# Patient Record
Sex: MALE | Race: WHITE | Employment: OTHER | URBAN - NONMETROPOLITAN AREA
[De-identification: names, ages, dates, MRNs, and addresses within clinical notes are randomized per-mention and may not be internally consistent; named-entity substitution may affect disease eponyms.]

---

## 2018-12-31 ENCOUNTER — HOSPITAL ENCOUNTER (OUTPATIENT)
Dept: CARDIAC REHAB | Age: 63
Setting detail: THERAPIES SERIES
Discharge: HOME | End: 2018-12-31
Payer: COMMERCIAL

## 2018-12-31 VITALS
RESPIRATION RATE: 16 BRPM | WEIGHT: 200 LBS | DIASTOLIC BLOOD PRESSURE: 62 MMHG | HEART RATE: 60 BPM | BODY MASS INDEX: 28.63 KG/M2 | HEIGHT: 70 IN | SYSTOLIC BLOOD PRESSURE: 118 MMHG

## 2018-12-31 RX ORDER — FUROSEMIDE 40 MG/1
40 TABLET ORAL 2 TIMES DAILY
COMMUNITY

## 2018-12-31 RX ORDER — AMIODARONE HYDROCHLORIDE 200 MG/1
200 TABLET ORAL DAILY
COMMUNITY

## 2018-12-31 RX ORDER — ATORVASTATIN CALCIUM 40 MG/1
40 TABLET, FILM COATED ORAL DAILY
COMMUNITY

## 2018-12-31 RX ORDER — SPIRONOLACTONE 25 MG/1
25 TABLET ORAL DAILY
COMMUNITY

## 2018-12-31 RX ORDER — ASPIRIN 81 MG/1
81 TABLET, CHEWABLE ORAL DAILY
COMMUNITY

## 2018-12-31 RX ORDER — LISINOPRIL 5 MG/1
5 TABLET ORAL DAILY
COMMUNITY

## 2019-01-07 ENCOUNTER — HOSPITAL ENCOUNTER (OUTPATIENT)
Dept: CARDIAC REHAB | Age: 64
Setting detail: THERAPIES SERIES
Discharge: HOME | End: 2019-01-07
Payer: COMMERCIAL

## 2019-01-07 PROCEDURE — 93798 PHYS/QHP OP CAR RHAB W/ECG: CPT

## 2019-01-09 ENCOUNTER — HOSPITAL ENCOUNTER (OUTPATIENT)
Dept: CARDIAC REHAB | Age: 64
Setting detail: THERAPIES SERIES
Discharge: HOME | End: 2019-01-09
Payer: COMMERCIAL

## 2019-01-09 PROCEDURE — 93798 PHYS/QHP OP CAR RHAB W/ECG: CPT

## 2019-01-10 ENCOUNTER — HOSPITAL ENCOUNTER (OUTPATIENT)
Dept: CARDIAC REHAB | Age: 64
Setting detail: THERAPIES SERIES
Discharge: HOME | End: 2019-01-10
Payer: COMMERCIAL

## 2019-01-10 PROCEDURE — 93798 PHYS/QHP OP CAR RHAB W/ECG: CPT

## 2019-01-14 ENCOUNTER — HOSPITAL ENCOUNTER (OUTPATIENT)
Dept: CARDIAC REHAB | Age: 64
Setting detail: THERAPIES SERIES
Discharge: HOME | End: 2019-01-14
Payer: COMMERCIAL

## 2019-01-14 PROCEDURE — 93798 PHYS/QHP OP CAR RHAB W/ECG: CPT

## 2019-01-16 ENCOUNTER — HOSPITAL ENCOUNTER (OUTPATIENT)
Dept: CARDIAC REHAB | Age: 64
Setting detail: THERAPIES SERIES
Discharge: HOME | End: 2019-01-16
Payer: COMMERCIAL

## 2019-01-16 PROCEDURE — 93798 PHYS/QHP OP CAR RHAB W/ECG: CPT

## 2019-01-17 ENCOUNTER — HOSPITAL ENCOUNTER (OUTPATIENT)
Dept: CARDIAC REHAB | Age: 64
Setting detail: THERAPIES SERIES
Discharge: HOME | End: 2019-01-17
Payer: COMMERCIAL

## 2019-01-17 PROCEDURE — 93798 PHYS/QHP OP CAR RHAB W/ECG: CPT

## 2019-01-21 ENCOUNTER — HOSPITAL ENCOUNTER (OUTPATIENT)
Dept: CARDIAC REHAB | Age: 64
Setting detail: THERAPIES SERIES
Discharge: HOME | End: 2019-01-21
Payer: COMMERCIAL

## 2019-01-21 PROCEDURE — 93798 PHYS/QHP OP CAR RHAB W/ECG: CPT

## 2019-01-23 ENCOUNTER — HOSPITAL ENCOUNTER (OUTPATIENT)
Dept: CARDIAC REHAB | Age: 64
Setting detail: THERAPIES SERIES
Discharge: HOME | End: 2019-01-23
Payer: COMMERCIAL

## 2019-01-23 PROCEDURE — 93798 PHYS/QHP OP CAR RHAB W/ECG: CPT

## 2019-08-28 ENCOUNTER — HOSPITAL ENCOUNTER (EMERGENCY)
Age: 64
Discharge: HOME | End: 2019-08-28
Attending: EMERGENCY MEDICINE
Payer: COMMERCIAL

## 2019-08-28 VITALS
TEMPERATURE: 97.6 F | OXYGEN SATURATION: 94 % | SYSTOLIC BLOOD PRESSURE: 107 MMHG | DIASTOLIC BLOOD PRESSURE: 67 MMHG | RESPIRATION RATE: 20 BRPM | HEART RATE: 65 BPM

## 2019-08-28 DIAGNOSIS — R33.9 URINARY RETENTION: Primary | ICD-10-CM

## 2019-08-28 LAB
-: ABNORMAL
AMORPHOUS: ABNORMAL
BACTERIA: ABNORMAL
BILIRUBIN URINE: ABNORMAL
CASTS UA: ABNORMAL /LPF
COLOR: ABNORMAL
COMMENT UA: ABNORMAL
CRYSTALS, UA: ABNORMAL /HPF
EPITHELIAL CELLS UA: ABNORMAL /HPF (ref 0–5)
GLUCOSE URINE: ABNORMAL
KETONES, URINE: ABNORMAL
LEUKOCYTE ESTERASE, URINE: ABNORMAL
MUCUS: ABNORMAL
NITRITE, URINE: ABNORMAL
OTHER OBSERVATIONS UA: ABNORMAL
PH UA: ABNORMAL (ref 5–9)
PROTEIN UA: ABNORMAL
RBC UA: ABNORMAL /HPF (ref 0–2)
RENAL EPITHELIAL, UA: ABNORMAL /HPF
SPECIFIC GRAVITY UA: 1.02 (ref 1.01–1.02)
TRICHOMONAS: ABNORMAL
TURBIDITY: CLEAR
URINE HGB: ABNORMAL
UROBILINOGEN, URINE: ABNORMAL
WBC UA: ABNORMAL /HPF (ref 0–5)
YEAST: ABNORMAL

## 2019-08-28 PROCEDURE — 87086 URINE CULTURE/COLONY COUNT: CPT

## 2019-08-28 PROCEDURE — 51702 INSERT TEMP BLADDER CATH: CPT

## 2019-08-28 PROCEDURE — 81001 URINALYSIS AUTO W/SCOPE: CPT

## 2019-08-28 PROCEDURE — 99283 EMERGENCY DEPT VISIT LOW MDM: CPT

## 2019-08-28 RX ORDER — PHENAZOPYRIDINE HYDROCHLORIDE 95 MG/1
95 TABLET ORAL 3 TIMES DAILY PRN
COMMUNITY
End: 2019-09-03

## 2019-08-28 RX ORDER — SENNA PLUS 8.6 MG/1
1 TABLET ORAL DAILY
COMMUNITY

## 2019-08-28 RX ORDER — OXYCODONE HYDROCHLORIDE AND ACETAMINOPHEN 5; 325 MG/1; MG/1
2 TABLET ORAL EVERY 4 HOURS PRN
COMMUNITY
End: 2020-01-22 | Stop reason: ALTCHOICE

## 2019-08-28 ASSESSMENT — PAIN DESCRIPTION - DESCRIPTORS
DESCRIPTORS: ACHING
DESCRIPTORS: ACHING

## 2019-08-28 ASSESSMENT — PAIN DESCRIPTION - LOCATION
LOCATION: KNEE
LOCATION: PENIS

## 2019-08-28 ASSESSMENT — PAIN DESCRIPTION - PAIN TYPE
TYPE: ACUTE PAIN
TYPE: ACUTE PAIN

## 2019-08-28 ASSESSMENT — PAIN SCALES - GENERAL
PAINLEVEL_OUTOF10: 9
PAINLEVEL_OUTOF10: 5

## 2019-08-28 ASSESSMENT — PAIN DESCRIPTION - ORIENTATION: ORIENTATION: RIGHT

## 2019-08-28 ASSESSMENT — PAIN DESCRIPTION - FREQUENCY: FREQUENCY: CONTINUOUS

## 2019-08-28 NOTE — ED NOTES
Patient discharged with an assist of two. Patient transferred to wheelchair and assisted to car.       Cass Miranda RN  08/28/19 0752

## 2019-08-29 LAB
CULTURE: NO GROWTH
Lab: NORMAL
SPECIMEN DESCRIPTION: NORMAL

## 2019-09-03 ENCOUNTER — OFFICE VISIT (OUTPATIENT)
Dept: UROLOGY | Age: 64
End: 2019-09-03
Payer: COMMERCIAL

## 2019-09-03 VITALS
SYSTOLIC BLOOD PRESSURE: 102 MMHG | WEIGHT: 221 LBS | BODY MASS INDEX: 32.17 KG/M2 | TEMPERATURE: 97.4 F | DIASTOLIC BLOOD PRESSURE: 72 MMHG

## 2019-09-03 DIAGNOSIS — N13.8 BPH WITH OBSTRUCTION/LOWER URINARY TRACT SYMPTOMS: ICD-10-CM

## 2019-09-03 DIAGNOSIS — R33.9 URINARY RETENTION: Primary | ICD-10-CM

## 2019-09-03 DIAGNOSIS — N40.1 BPH WITH OBSTRUCTION/LOWER URINARY TRACT SYMPTOMS: ICD-10-CM

## 2019-09-03 DIAGNOSIS — Z12.5 PROSTATE CANCER SCREENING: ICD-10-CM

## 2019-09-03 PROCEDURE — 99204 OFFICE O/P NEW MOD 45 MIN: CPT | Performed by: NURSE PRACTITIONER

## 2019-09-03 RX ORDER — TAMSULOSIN HYDROCHLORIDE 0.4 MG/1
0.4 CAPSULE ORAL DAILY
Qty: 30 CAPSULE | Refills: 11 | Status: SHIPPED | OUTPATIENT
Start: 2019-09-03 | End: 2019-10-21 | Stop reason: ALTCHOICE

## 2019-09-03 ASSESSMENT — ENCOUNTER SYMPTOMS
EYE PAIN: 0
BACK PAIN: 0
COLOR CHANGE: 0
VOMITING: 0
COUGH: 0
NAUSEA: 0
ABDOMINAL PAIN: 0
WHEEZING: 0
EYE REDNESS: 0
CONSTIPATION: 0
SHORTNESS OF BREATH: 0

## 2019-09-03 NOTE — PROGRESS NOTES
HPI:    Patient is a 61 y.o. male in no acute distress. He is alert and oriented to person, place, and time. New patient referral from the ER for urinary retention. He did undergo a right knee replacement on 8/26/2019. On 8/28/2019 he did present to the ER with abdominal pain and the inability to urinate. He did have a Herzog catheter placed while in the ER for an unknown amount. Urine culture was negative for infection. Prior to surgery he did have baseline lower urinary tract symptoms of straining with urination, feelings of incomplete bladder emptying, nocturia x4, and frequency every 2 hours. He denies any incontinence, dysuria or gross hematuria. He denies any prior history of urinary retention requiring a catheter. His bowels are moving daily since surgery. He has not had any gross hematuria in the catheter bag. He is tolerating Herzog catheter without complaints. Past Medical History:   Diagnosis Date    Arthritis     CAD (coronary artery disease)     CHF (congestive heart failure) (HCC)     Hyperlipidemia     Hypertension     MI (myocardial infarction) ([facility] [address]      Past Surgical History:   Procedure Laterality Date    HERNIA REPAIR      JOINT REPLACEMENT Right 08/2019    knee     Outpatient Encounter Medications as of 9/3/2019   Medication Sig Dispense Refill    tamsulosin (FLOMAX) 0.4 MG capsule Take 1 capsule by mouth daily Take to facilitate stone passage 30 capsule 11    apixaban (ELIQUIS) 5 MG TABS tablet Take 5 mg by mouth 2 times daily      senna (SENOKOT) 8.6 MG tablet Take 1 tablet by mouth daily      oxyCODONE-acetaminophen (PERCOCET) 5-325 MG per tablet Take 2 tablets by mouth every 4 hours as needed for Pain.       aspirin 81 MG chewable tablet Take 81 mg by mouth daily      amiodarone (CORDARONE) 200 MG tablet Take 200 mg by mouth daily      atorvastatin (LIPITOR) 40 MG tablet Take 40 mg by mouth daily      furosemide (LASIX) 40 MG tablet Take 40 mg by mouth 2 times

## 2019-09-18 ENCOUNTER — OFFICE VISIT (OUTPATIENT)
Dept: UROLOGY | Age: 64
End: 2019-09-18
Payer: COMMERCIAL

## 2019-09-18 VITALS — BODY MASS INDEX: 32.6 KG/M2 | SYSTOLIC BLOOD PRESSURE: 100 MMHG | WEIGHT: 224 LBS | DIASTOLIC BLOOD PRESSURE: 60 MMHG

## 2019-09-18 DIAGNOSIS — N13.8 BPH WITH OBSTRUCTION/LOWER URINARY TRACT SYMPTOMS: ICD-10-CM

## 2019-09-18 DIAGNOSIS — N40.1 BPH WITH OBSTRUCTION/LOWER URINARY TRACT SYMPTOMS: ICD-10-CM

## 2019-09-18 DIAGNOSIS — R33.9 URINARY RETENTION: Primary | ICD-10-CM

## 2019-09-18 PROCEDURE — 99213 OFFICE O/P EST LOW 20 MIN: CPT | Performed by: UROLOGY

## 2019-09-18 PROCEDURE — 51798 US URINE CAPACITY MEASURE: CPT | Performed by: UROLOGY

## 2019-09-18 PROCEDURE — 51700 IRRIGATION OF BLADDER: CPT | Performed by: UROLOGY

## 2019-09-18 ASSESSMENT — ENCOUNTER SYMPTOMS
COUGH: 0
EYE PAIN: 0
VOMITING: 0
WHEEZING: 0
BACK PAIN: 0
NAUSEA: 0
SHORTNESS OF BREATH: 0
COLOR CHANGE: 0
EYE REDNESS: 0
ABDOMINAL PAIN: 0

## 2019-09-18 NOTE — PROGRESS NOTES
Use   Smoking Status Never Smoker   Smokeless Tobacco Never Used       Social History     Substance and Sexual Activity   Alcohol Use Yes    Alcohol/week: 1.0 standard drinks    Types: 1 Glasses of wine per week    Comment: beer       Review of Systems   Constitutional: Negative for appetite change, chills and fever. Eyes: Negative for pain, redness and visual disturbance. Respiratory: Negative for cough, shortness of breath and wheezing. Cardiovascular: Negative for chest pain and leg swelling. Gastrointestinal: Negative for abdominal pain, nausea and vomiting. Genitourinary: Negative for difficulty urinating, discharge, dysuria, flank pain, frequency, hematuria, scrotal swelling and testicular pain. Musculoskeletal: Negative for back pain, joint swelling and myalgias. Skin: Negative for color change, rash and wound. Neurological: Negative for dizziness, tremors and numbness. Hematological: Negative for adenopathy. Does not bruise/bleed easily. There were no vitals taken for this visit. PHYSICAL EXAM:  Constitutional: Patient in no acute distress; Neuro: alert and oriented to person place and time. Psych: Mood and affect normal.  Skin: Normal  Lungs: Respiratory effort normal  Cardiovascular:  Normal peripheral pulses  Abdomen: Soft, non-tender, non-distended with no CVA, flank pain, hepatosplenomegaly or hernia. Kidneys normal.  Bladder non-tender and not distended. Lymphatics: no palpable lymphadenopathy  Penis normal  Urethral meatus normal  Scrotal exam normal  Testicles normal bilaterally  Epididymis normal bilaterally  No evidence of inguinal hernia      No results found for: BUN  No results found for: CREATININE  No results found for: PSA    ASSESSMENT:  This is a 61 y.o. male with the following diagnoses:   Diagnosis Orders   1. Urinary retention     2. BPH with obstruction/lower urinary tract symptoms           PLAN:  Patient will follow-up with us in 4 weeks.   He

## 2019-09-22 ENCOUNTER — HOSPITAL ENCOUNTER (EMERGENCY)
Age: 64
Discharge: HOME | End: 2019-09-22
Attending: EMERGENCY MEDICINE
Payer: COMMERCIAL

## 2019-09-22 VITALS
SYSTOLIC BLOOD PRESSURE: 102 MMHG | TEMPERATURE: 98.3 F | HEART RATE: 70 BPM | RESPIRATION RATE: 16 BRPM | WEIGHT: 224 LBS | OXYGEN SATURATION: 96 % | DIASTOLIC BLOOD PRESSURE: 64 MMHG | BODY MASS INDEX: 32.6 KG/M2

## 2019-09-22 DIAGNOSIS — N39.0 UTI (URINARY TRACT INFECTION), UNCOMPLICATED: Primary | ICD-10-CM

## 2019-09-22 LAB
-: ABNORMAL
AMORPHOUS: ABNORMAL
BACTERIA: ABNORMAL
BILIRUBIN URINE: ABNORMAL
CASTS UA: ABNORMAL /LPF
COLOR: YELLOW
COMMENT UA: ABNORMAL
CRYSTALS, UA: ABNORMAL /HPF
EPITHELIAL CELLS UA: ABNORMAL /HPF (ref 0–5)
GLUCOSE URINE: NEGATIVE
KETONES, URINE: ABNORMAL
LEUKOCYTE ESTERASE, URINE: ABNORMAL
MUCUS: ABNORMAL
NITRITE, URINE: POSITIVE
OTHER OBSERVATIONS UA: ABNORMAL
PH UA: 5.5 (ref 5–9)
PROTEIN UA: ABNORMAL
RBC UA: ABNORMAL /HPF (ref 0–2)
RENAL EPITHELIAL, UA: ABNORMAL /HPF
SPECIFIC GRAVITY UA: 1.02 (ref 1.01–1.02)
TRICHOMONAS: ABNORMAL
TURBIDITY: ABNORMAL
URINE HGB: ABNORMAL
UROBILINOGEN, URINE: NORMAL
WBC UA: ABNORMAL /HPF (ref 0–5)
YEAST: ABNORMAL

## 2019-09-22 PROCEDURE — 87077 CULTURE AEROBIC IDENTIFY: CPT

## 2019-09-22 PROCEDURE — 87086 URINE CULTURE/COLONY COUNT: CPT

## 2019-09-22 PROCEDURE — 6360000002 HC RX W HCPCS: Performed by: EMERGENCY MEDICINE

## 2019-09-22 PROCEDURE — 87186 SC STD MICRODIL/AGAR DIL: CPT

## 2019-09-22 PROCEDURE — 96372 THER/PROPH/DIAG INJ SC/IM: CPT

## 2019-09-22 PROCEDURE — 99283 EMERGENCY DEPT VISIT LOW MDM: CPT

## 2019-09-22 PROCEDURE — 81001 URINALYSIS AUTO W/SCOPE: CPT

## 2019-09-22 RX ORDER — CEFTRIAXONE 1 G/1
1 INJECTION, POWDER, FOR SOLUTION INTRAMUSCULAR; INTRAVENOUS ONCE
Status: COMPLETED | OUTPATIENT
Start: 2019-09-22 | End: 2019-09-22

## 2019-09-22 RX ORDER — CEPHALEXIN 500 MG/1
500 CAPSULE ORAL 4 TIMES DAILY
Qty: 40 CAPSULE | Refills: 0 | Status: SHIPPED | OUTPATIENT
Start: 2019-09-22 | End: 2019-10-02

## 2019-09-22 RX ADMIN — CEFTRIAXONE 1 G: 1 INJECTION, POWDER, FOR SOLUTION INTRAMUSCULAR; INTRAVENOUS at 12:44

## 2019-09-22 NOTE — ED PROVIDER NOTES
[facility] ED  EMERGENCY DEPARTMENT     Pt Name: Andrew Blanco  MRN: [id number]  [place] 1955  Date of evaluation: 9/22/2019  Provider: Emil Granado DO, [address]       Chief Complaint   Patient presents with    Dysuria     minimal out with pain during urination       HISTORY OF PRESENT ILLNESS    Andrew Blanco is a 61 y.o. male who presents to the emergency department from home with complaints of dysuria, urgency, frequency urination and burning in the penis. The patient had a Herzog catheter that was removed 4 days ago, which was placed approximately 2 weeks ago for acute urinary retention postop. States he has been weight voiding well until the symptoms started 2 days ago. No nausea or vomiting. He states he has not had a fever, however his significant other mentioned that he had a temp up to 102 Fahrenheit 3 days ago. He denies any vomiting, denies any back pain. Triage notes and Nursing notes were reviewed by myself. Any discrepancies are addressed above.     PAST MEDICAL HISTORY     Past Medical History:   Diagnosis Date    Arthritis     CAD (coronary artery disease)     [facility]     Hyperlipidemia     Hypertension     MI (myocardial infarction) ([facility] [address]        SURGICAL HISTORY       Past Surgical History:   Procedure Laterality Date    HERNIA REPAIR      JOINT REPLACEMENT Right 08/2019    knee       CURRENT MEDICATIONS       Previous Medications    AMIODARONE (CORDARONE) 200 MG TABLET    Take 200 mg by mouth daily    APIXABAN (ELIQUIS) 5 MG TABS TABLET    Take 5 mg by mouth 2 times daily    ASPIRIN 81 MG CHEWABLE TABLET    Take 81 mg by mouth daily    ATORVASTATIN (LIPITOR) 40 MG TABLET    Take 40 mg by mouth daily    FUROSEMIDE (LASIX) 40 MG TABLET    Take 40 mg by mouth 2 times daily    LISINOPRIL (PRINIVIL;ZESTRIL) 5 MG TABLET    Take 5 mg by mouth daily    METOPROLOL TARTRATE (LOPRESSOR) 25 MG TABLET    Take 25 mg by mouth 2 times daily    OXYCODONE-ACETAMINOPHEN (PERCOCET) 5-325 MG PER TABLET    Take 2 tablets by mouth every 4 hours as needed for Pain. SENNA (SENOKOT) 8.6 MG TABLET    Take 1 tablet by mouth daily    SPIRONOLACTONE (ALDACTONE) 25 MG TABLET    Take 25 mg by mouth daily    TAMSULOSIN (FLOMAX) 0.4 MG CAPSULE    Take 1 capsule by mouth daily Take to facilitate stone passage       ALLERGIES     Patient has no known allergies. FAMILY HISTORY       Family History   Problem Relation Age of Onset    Heart Attack Father         SOCIAL HISTORY       Social History     Socioeconomic History    Marital status:      Spouse name: None    Number of children: None    Years of education: None    Highest education level: None   Occupational History    None   Social Needs    Financial resource strain: None    Food insecurity:     Worry: None     Inability: None    Transportation needs:     Medical: None     Non-medical: None   Tobacco Use    Smoking status: Never Smoker    Smokeless tobacco: Never Used   Substance and Sexual Activity    Alcohol use: Yes     Alcohol/week: 1.0 standard drinks     Types: 1 Glasses of wine per week     Comment: beer    Drug use: No    Sexual activity: Yes     Partners: Female   Lifestyle    Physical activity:     Days per week: None     Minutes per session: None    Stress: None   Relationships    Social connections:     Talks on phone: None     Gets together: None     Attends Orthodox service: None     Active member of club or organization: None     Attends meetings of clubs or organizations: None     Relationship status: None    Intimate partner violence:     Fear of current or ex partner: None     Emotionally abused: None     Physically abused: None     Forced sexual activity: None   Other Topics Concern    None   Social History Narrative    None       REVIEW OF SYSTEMS     Review of Systems   Constitutional: Positive for fever. Negative for chills.

## 2019-09-23 ENCOUNTER — TELEPHONE (OUTPATIENT)
Dept: UROLOGY | Age: 64
End: 2019-09-23

## 2019-09-23 NOTE — TELEPHONE ENCOUNTER
Contacted the patient, he advised he had reported to the ER on Sunday 09/22/2019 for painful urination. Patient was prescribed Keflex by the ER. Patient denies experiencing any current symptoms of pain, burning, hematuria, nausea, vomiting, fever, or chills. Patient was advised to contact the office should he develop any worsening symptoms. Patient voiced understanding.

## 2019-09-24 LAB
CULTURE: ABNORMAL
Lab: ABNORMAL
SPECIMEN DESCRIPTION: ABNORMAL

## 2019-10-15 ENCOUNTER — HOSPITAL ENCOUNTER (OUTPATIENT)
Age: 64
Discharge: HOME | End: 2019-10-15
Payer: COMMERCIAL

## 2019-10-15 LAB
ABSOLUTE EOS #: 0.28 K/UL (ref 0–0.44)
ABSOLUTE IMMATURE GRANULOCYTE: 0.03 K/UL (ref 0–0.3)
ABSOLUTE LYMPH #: 1.91 K/UL (ref 1.1–3.7)
ABSOLUTE MONO #: 0.58 K/UL (ref 0.1–1.2)
BASOPHILS # BLD: 1 % (ref 0–2)
BASOPHILS ABSOLUTE: 0.06 K/UL (ref 0–0.2)
C-REACTIVE PROTEIN: 22.4 MG/L (ref 0–5)
DIFFERENTIAL TYPE: ABNORMAL
EOSINOPHILS RELATIVE PERCENT: 3 % (ref 1–4)
HCT VFR BLD CALC: 43.9 % (ref 40.7–50.3)
HEMOGLOBIN: 13.1 G/DL (ref 13–17)
IMMATURE GRANULOCYTES: 0 %
LYMPHOCYTES # BLD: 18 % (ref 24–43)
MCH RBC QN AUTO: 28.2 PG (ref 25.2–33.5)
MCHC RBC AUTO-ENTMCNC: 29.8 G/DL (ref 28.4–34.8)
MCV RBC AUTO: 94.4 FL (ref 82.6–102.9)
MONOCYTES # BLD: 6 % (ref 3–12)
NRBC AUTOMATED: 0 PER 100 WBC
PDW BLD-RTO: 13.2 % (ref 11.8–14.4)
PLATELET # BLD: 408 K/UL (ref 138–453)
PLATELET ESTIMATE: ABNORMAL
PMV BLD AUTO: 10.6 FL (ref 8.1–13.5)
RBC # BLD: 4.65 M/UL (ref 4.21–5.77)
RBC # BLD: ABNORMAL 10*6/UL
SEDIMENTATION RATE, ERYTHROCYTE: 61 MM (ref 0–20)
SEG NEUTROPHILS: 72 % (ref 36–65)
SEGMENTED NEUTROPHILS ABSOLUTE COUNT: 7.69 K/UL (ref 1.5–8.1)
WBC # BLD: 10.6 K/UL (ref 3.5–11.3)
WBC # BLD: ABNORMAL 10*3/UL

## 2019-10-15 PROCEDURE — 85651 RBC SED RATE NONAUTOMATED: CPT

## 2019-10-15 PROCEDURE — 36415 COLL VENOUS BLD VENIPUNCTURE: CPT

## 2019-10-15 PROCEDURE — 85025 COMPLETE CBC W/AUTO DIFF WBC: CPT

## 2019-10-15 PROCEDURE — 86431 RHEUMATOID FACTOR QUANT: CPT

## 2019-10-15 PROCEDURE — 86140 C-REACTIVE PROTEIN: CPT

## 2019-10-15 PROCEDURE — 86038 ANTINUCLEAR ANTIBODIES: CPT

## 2019-10-16 LAB
ANTI-NUCLEAR ANTIBODY (ANA): NEGATIVE
RHEUMATOID FACTOR: <10 IU/ML

## 2019-10-21 ENCOUNTER — OFFICE VISIT (OUTPATIENT)
Dept: UROLOGY | Age: 64
End: 2019-10-21
Payer: COMMERCIAL

## 2019-10-21 ENCOUNTER — HOSPITAL ENCOUNTER (OUTPATIENT)
Age: 64
Setting detail: SPECIMEN
Discharge: HOME | End: 2019-10-21
Payer: COMMERCIAL

## 2019-10-21 VITALS
WEIGHT: 207 LBS | SYSTOLIC BLOOD PRESSURE: 100 MMHG | HEIGHT: 70 IN | BODY MASS INDEX: 29.63 KG/M2 | DIASTOLIC BLOOD PRESSURE: 70 MMHG

## 2019-10-21 DIAGNOSIS — N13.8 BPH WITH OBSTRUCTION/LOWER URINARY TRACT SYMPTOMS: Primary | ICD-10-CM

## 2019-10-21 DIAGNOSIS — N40.1 BPH WITH OBSTRUCTION/LOWER URINARY TRACT SYMPTOMS: Primary | ICD-10-CM

## 2019-10-21 DIAGNOSIS — R33.9 URINARY RETENTION: ICD-10-CM

## 2019-10-21 DIAGNOSIS — N40.1 BPH WITH OBSTRUCTION/LOWER URINARY TRACT SYMPTOMS: ICD-10-CM

## 2019-10-21 DIAGNOSIS — N13.8 BPH WITH OBSTRUCTION/LOWER URINARY TRACT SYMPTOMS: ICD-10-CM

## 2019-10-21 LAB
-: ABNORMAL
AMORPHOUS: ABNORMAL
BACTERIA: ABNORMAL
BILIRUBIN URINE: NEGATIVE
CASTS UA: ABNORMAL /LPF
COLOR: YELLOW
COMMENT UA: ABNORMAL
CRYSTALS, UA: ABNORMAL /HPF
EPITHELIAL CELLS UA: ABNORMAL /HPF (ref 0–5)
GLUCOSE URINE: NEGATIVE
KETONES, URINE: NEGATIVE
LEUKOCYTE ESTERASE, URINE: NEGATIVE
MUCUS: ABNORMAL
NITRITE, URINE: NEGATIVE
OTHER OBSERVATIONS UA: ABNORMAL
PH UA: 5.5 (ref 5–9)
PROTEIN UA: NEGATIVE
RBC UA: ABNORMAL /HPF (ref 0–2)
RENAL EPITHELIAL, UA: ABNORMAL /HPF
SPECIFIC GRAVITY UA: 1.02 (ref 1.01–1.02)
TRICHOMONAS: ABNORMAL
TURBIDITY: CLEAR
URINE HGB: NEGATIVE
UROBILINOGEN, URINE: NORMAL
WBC UA: ABNORMAL /HPF (ref 0–5)
YEAST: ABNORMAL

## 2019-10-21 PROCEDURE — 87086 URINE CULTURE/COLONY COUNT: CPT

## 2019-10-21 PROCEDURE — 99213 OFFICE O/P EST LOW 20 MIN: CPT | Performed by: NURSE PRACTITIONER

## 2019-10-21 PROCEDURE — 81001 URINALYSIS AUTO W/SCOPE: CPT

## 2019-10-21 PROCEDURE — 51798 US URINE CAPACITY MEASURE: CPT | Performed by: NURSE PRACTITIONER

## 2019-10-21 ASSESSMENT — ENCOUNTER SYMPTOMS
COLOR CHANGE: 0
COUGH: 0
WHEEZING: 0
NAUSEA: 0
EYE PAIN: 0
VOMITING: 0
CONSTIPATION: 1
SHORTNESS OF BREATH: 0
ABDOMINAL PAIN: 0
EYE REDNESS: 0
BACK PAIN: 0

## 2019-10-22 LAB
CULTURE: NORMAL
Lab: NORMAL
SPECIMEN DESCRIPTION: NORMAL

## 2019-10-23 ENCOUNTER — TELEPHONE (OUTPATIENT)
Dept: UROLOGY | Age: 64
End: 2019-10-23

## 2020-01-21 NOTE — PROGRESS NOTES
HPI:    Patient is a 59 y.o. male in no acute distress. He is alert and oriented to person, place, and time. History  New patient referral from the ER for urinary retention.  He did undergo a right knee replacement on 8/26/2019.  On 8/28/2019 he did present to the ER with abdominal pain and the inability to urinate.  He did have a Herzog catheter placed while in the ER for an unknown amount.  Urine culture was negative for infection.  Prior to surgery he did have baseline lower urinary tract symptoms of straining with urination, feelings of incomplete bladder emptying, nocturia x4, and frequency every 2 hours.  He denies any incontinence, dysuria or gross hematuria.  He denies any prior history of urinary retention requiring a catheter.  His bowels are moving daily since surgery.  He has not had any gross hematuria in the catheter bag.  He is tolerating Herzog catheter without complaints.     Currently  Pt is here today for 3-month follow-up. Patient did get a recent PSA. This value was. Patient is voiding well. He said he had stopped his Flomax. She was able to void 20 minutes ago. His current random bladder scan is 173 mL. Patient was unable to void approximately 200 mL. He is complaining about nocturia x2. He reports some urgency and frequency during the day. He is taking diuretics. No recent gross hematuria dysuria. Patient did have recent lab work done at the [address] [place] [address]. He did not have this available to us.     Past Medical History:   Diagnosis Date    Arthritis     CAD (coronary artery disease)     CHF (congestive heart failure) (HCC)     Hyperlipidemia     Hypertension     MI (myocardial infarction) ([facility] [address]      Past Surgical History:   Procedure Laterality Date    HERNIA REPAIR      JOINT REPLACEMENT Right 08/2019    knee     Outpatient Encounter Medications as of 1/22/2020   Medication Sig Dispense Refill    apixaban (ELIQUIS) 5 MG TABS tablet Take 5 mg by mouth 2 times daily      senna (SENOKOT) 8.6 MG tablet Take 1 tablet by mouth daily      oxyCODONE-acetaminophen (PERCOCET) 5-325 MG per tablet Take 2 tablets by mouth every 4 hours as needed for Pain.  aspirin 81 MG chewable tablet Take 81 mg by mouth daily      amiodarone (CORDARONE) 200 MG tablet Take 200 mg by mouth daily      atorvastatin (LIPITOR) 40 MG tablet Take 40 mg by mouth daily      furosemide (LASIX) 40 MG tablet Take 40 mg by mouth 2 times daily      lisinopril (PRINIVIL;ZESTRIL) 5 MG tablet Take 5 mg by mouth daily      metoprolol tartrate (LOPRESSOR) 25 MG tablet Take 25 mg by mouth 2 times daily      spironolactone (ALDACTONE) 25 MG tablet Take 25 mg by mouth daily       No facility-administered encounter medications on file as of 1/22/2020. Current Outpatient Medications on File Prior to Visit   Medication Sig Dispense Refill    apixaban (ELIQUIS) 5 MG TABS tablet Take 5 mg by mouth 2 times daily      senna (SENOKOT) 8.6 MG tablet Take 1 tablet by mouth daily      oxyCODONE-acetaminophen (PERCOCET) 5-325 MG per tablet Take 2 tablets by mouth every 4 hours as needed for Pain.  aspirin 81 MG chewable tablet Take 81 mg by mouth daily      amiodarone (CORDARONE) 200 MG tablet Take 200 mg by mouth daily      atorvastatin (LIPITOR) 40 MG tablet Take 40 mg by mouth daily      furosemide (LASIX) 40 MG tablet Take 40 mg by mouth 2 times daily      lisinopril (PRINIVIL;ZESTRIL) 5 MG tablet Take 5 mg by mouth daily      metoprolol tartrate (LOPRESSOR) 25 MG tablet Take 25 mg by mouth 2 times daily      spironolactone (ALDACTONE) 25 MG tablet Take 25 mg by mouth daily       No current facility-administered medications on file prior to visit. Patient has no known allergies.   Family History   Problem Relation Age of Onset    Heart Attack Father      Social History     Tobacco Use   Smoking Status Never Smoker   Smokeless Tobacco Never Used       Social History     Substance and Sexual Activity   Alcohol us in 6 months with a repeat PVR. We will obtain labs from outside facility.

## 2020-01-22 ENCOUNTER — OFFICE VISIT (OUTPATIENT)
Dept: UROLOGY | Age: 65
End: 2020-01-22
Payer: COMMERCIAL

## 2020-01-22 VITALS
HEIGHT: 72 IN | DIASTOLIC BLOOD PRESSURE: 68 MMHG | SYSTOLIC BLOOD PRESSURE: 122 MMHG | WEIGHT: 230 LBS | TEMPERATURE: 98.7 F | BODY MASS INDEX: 31.15 KG/M2

## 2020-01-22 PROCEDURE — 99213 OFFICE O/P EST LOW 20 MIN: CPT | Performed by: UROLOGY

## 2020-01-22 PROCEDURE — 51798 US URINE CAPACITY MEASURE: CPT | Performed by: UROLOGY

## 2020-01-22 ASSESSMENT — ENCOUNTER SYMPTOMS
EYE PAIN: 0
WHEEZING: 0
ABDOMINAL PAIN: 0
COUGH: 0
EYE REDNESS: 0
SHORTNESS OF BREATH: 0
NAUSEA: 0
COLOR CHANGE: 0
BACK PAIN: 0
VOMITING: 0

## 2020-07-27 ENCOUNTER — OFFICE VISIT (OUTPATIENT)
Dept: UROLOGY | Age: 65
End: 2020-07-27
Payer: COMMERCIAL

## 2020-07-27 VITALS
DIASTOLIC BLOOD PRESSURE: 68 MMHG | SYSTOLIC BLOOD PRESSURE: 110 MMHG | HEIGHT: 72 IN | TEMPERATURE: 97.5 F | BODY MASS INDEX: 33.32 KG/M2 | WEIGHT: 246 LBS

## 2020-07-27 PROCEDURE — 99214 OFFICE O/P EST MOD 30 MIN: CPT | Performed by: UROLOGY

## 2020-07-27 PROCEDURE — 51798 US URINE CAPACITY MEASURE: CPT | Performed by: UROLOGY

## 2020-07-27 RX ORDER — TAMSULOSIN HYDROCHLORIDE 0.4 MG/1
0.4 CAPSULE ORAL EVERY EVENING
Qty: 30 CAPSULE | Refills: 11 | Status: SHIPPED | OUTPATIENT
Start: 2020-07-27 | End: 2021-02-24

## 2020-07-27 NOTE — PROGRESS NOTES
HPI:    Patient is a 59 y.o. male in no acute distress. He is alert and oriented to person, place, and time. History  New patient referral from the ER for urinary retention.  He did undergo a right knee replacement on 8/26/2019.  On 8/28/2019 he did present to the ER with abdominal pain and the inability to urinate.  He did have a Herzog catheter placed while in the ER for an unknown amount.  Urine culture was negative for infection.  Prior to surgery he did have baseline lower urinary tract symptoms of straining with urination, feelings of incomplete bladder emptying, nocturia x4, and frequency every 2 hours.  He denies any incontinence, dysuria or gross hematuria.  He denies any prior history of urinary retention requiring a catheter.  His bowels are moving daily since surgery.  He has not had any gross hematuria in the catheter bag.  He is tolerating Herzog catheter without complaints.       Currently  Patient is here today for repeat PVR. He is also here for repeat PSA. Patient is doing well. He was unable to void today in the office but did have a residual of 0 mL. He is getting his left-sided knee replaced. He is concerned about urinary issues after the procedure. No recent gross hematuria dysuria. IPSS Questionnaire (AUA-7): Over the past month    1)  How often have you had a sensation of not emptying your bladder completely after you finish urinating? 0 - Not at all   2)  How often have you had to urinate again less than two hours after you finished urinating? 1 - Less than 1 time in 5   3)  How often have you found you stopped and started again several times when you urinated? 1 - Less than 1 time in 5   4) How difficult have you found it to postpone urination? 4 - More than half the time   5) How often have you had a weak urinary stream?  3 - About half the time   6) How often have you had to push or strain to begin urination?   0 - Not at all   7) How many times did you most typically get up to urinate from the time you went to bed until the time you got up in the morning? 2 - 2 times   Total 11   QOL 2       Past Medical History:   Diagnosis Date    Arthritis     CAD (coronary artery disease)     [facility]     Hyperlipidemia     Hypertension     MI (myocardial infarction) ([facility] [address]      Past Surgical History:   Procedure Laterality Date    HERNIA REPAIR      JOINT REPLACEMENT Right 08/2019    knee     Outpatient Encounter Medications as of 7/27/2020   Medication Sig Dispense Refill    NONFORMULARY Indications: stool softner       NONFORMULARY       Glucosamine-Chondroit-Vit C-Mn (GLUCOSAMINE CHONDR 500 COMPLEX PO) Take by mouth      apixaban (ELIQUIS) 5 MG TABS tablet Take 5 mg by mouth 2 times daily      senna (SENOKOT) 8.6 MG tablet Take 1 tablet by mouth daily      aspirin 81 MG chewable tablet Take 81 mg by mouth daily      amiodarone (CORDARONE) 200 MG tablet Take 200 mg by mouth daily      atorvastatin (LIPITOR) 40 MG tablet Take 40 mg by mouth daily      furosemide (LASIX) 40 MG tablet Take 40 mg by mouth 2 times daily      lisinopril (PRINIVIL;ZESTRIL) 5 MG tablet Take 5 mg by mouth daily      metoprolol tartrate (LOPRESSOR) 25 MG tablet Take 25 mg by mouth 2 times daily      spironolactone (ALDACTONE) 25 MG tablet Take 25 mg by mouth daily       No facility-administered encounter medications on file as of 7/27/2020.        Current Outpatient Medications on File Prior to Visit   Medication Sig Dispense Refill    NONFORMULARY Indications: stool softner       NONFORMULARY       Glucosamine-Chondroit-Vit C-Mn (GLUCOSAMINE CHONDR 500 COMPLEX PO) Take by mouth      apixaban (ELIQUIS) 5 MG TABS tablet Take 5 mg by mouth 2 times daily      senna (SENOKOT) 8.6 MG tablet Take 1 tablet by mouth daily      aspirin 81 MG chewable tablet Take 81 mg by mouth daily      amiodarone (CORDARONE) 200 MG tablet Take 200 mg by mouth daily      atorvastatin urinary tract symptoms  MT MEASUREMENT,POST-VOID RESIDUAL VOLUME BY US,NON-IMAGING    tamsulosin (FLOMAX) 0.4 MG capsule         PLAN:  We will start him on Flomax now. This will be for his upcoming knee surgery. We will see him back in 6 months for lab review. He should have a repeat PSA done at the [address] [place] [address]

## 2020-07-27 NOTE — PATIENT INSTRUCTIONS
SURVEY:    You may be receiving a survey from [employer] regarding your visit today. Please complete the survey to enable us to provide the highest quality of care to you and your family. If you cannot score us a very good on any question, please call the office to discuss how we could of made your experience a very good one. Thank you.

## 2020-09-18 ENCOUNTER — HOSPITAL ENCOUNTER (OUTPATIENT)
Dept: PREADMISSION TESTING | Age: 65
Setting detail: SPECIMEN
Discharge: HOME | End: 2020-09-22
Payer: COMMERCIAL

## 2020-09-18 VITALS
DIASTOLIC BLOOD PRESSURE: 80 MMHG | WEIGHT: 243.3 LBS | SYSTOLIC BLOOD PRESSURE: 146 MMHG | TEMPERATURE: 97.8 F | OXYGEN SATURATION: 96 % | RESPIRATION RATE: 18 BRPM | HEIGHT: 71 IN | BODY MASS INDEX: 34.06 KG/M2 | HEART RATE: 57 BPM

## 2020-09-18 LAB
ABSOLUTE EOS #: 0.13 K/UL (ref 0–0.44)
ABSOLUTE IMMATURE GRANULOCYTE: <0.03 K/UL (ref 0–0.3)
ABSOLUTE LYMPH #: 1.31 K/UL (ref 1.1–3.7)
ABSOLUTE MONO #: 0.49 K/UL (ref 0.1–1.2)
ANION GAP SERPL CALCULATED.3IONS-SCNC: 8 MMOL/L (ref 9–17)
BASOPHILS # BLD: 1 % (ref 0–2)
BASOPHILS ABSOLUTE: 0.03 K/UL (ref 0–0.2)
BUN BLDV-MCNC: 18 MG/DL (ref 8–23)
BUN/CREAT BLD: 19 (ref 9–20)
CALCIUM SERPL-MCNC: 9.2 MG/DL (ref 8.6–10.4)
CHLORIDE BLD-SCNC: 98 MMOL/L (ref 98–107)
CO2: 25 MMOL/L (ref 20–31)
CREAT SERPL-MCNC: 0.95 MG/DL (ref 0.7–1.2)
DIFFERENTIAL TYPE: ABNORMAL
EOSINOPHILS RELATIVE PERCENT: 2 % (ref 1–4)
GFR AFRICAN AMERICAN: >60 ML/MIN
GFR NON-AFRICAN AMERICAN: >60 ML/MIN
GFR SERPL CREATININE-BSD FRML MDRD: ABNORMAL ML/MIN/{1.73_M2}
GFR SERPL CREATININE-BSD FRML MDRD: ABNORMAL ML/MIN/{1.73_M2}
GLUCOSE BLD-MCNC: 93 MG/DL (ref 70–99)
HCT VFR BLD CALC: 43.5 % (ref 40.7–50.3)
HEMOGLOBIN: 13.5 G/DL (ref 13–17)
IMMATURE GRANULOCYTES: 0 %
LYMPHOCYTES # BLD: 21 % (ref 24–43)
MCH RBC QN AUTO: 29.2 PG (ref 25.2–33.5)
MCHC RBC AUTO-ENTMCNC: 31 G/DL (ref 28.4–34.8)
MCV RBC AUTO: 94 FL (ref 82.6–102.9)
MONOCYTES # BLD: 8 % (ref 3–12)
NRBC AUTOMATED: 0 PER 100 WBC
PDW BLD-RTO: 14.2 % (ref 11.8–14.4)
PLATELET # BLD: 280 K/UL (ref 138–453)
PLATELET ESTIMATE: ABNORMAL
PMV BLD AUTO: 10.8 FL (ref 8.1–13.5)
POTASSIUM SERPL-SCNC: 5 MMOL/L (ref 3.7–5.3)
RBC # BLD: 4.63 M/UL (ref 4.21–5.77)
RBC # BLD: ABNORMAL 10*6/UL
SEG NEUTROPHILS: 68 % (ref 36–65)
SEGMENTED NEUTROPHILS ABSOLUTE COUNT: 4.37 K/UL (ref 1.5–8.1)
SODIUM BLD-SCNC: 131 MMOL/L (ref 135–144)
WBC # BLD: 6.4 K/UL (ref 3.5–11.3)
WBC # BLD: ABNORMAL 10*3/UL

## 2020-09-18 PROCEDURE — 36415 COLL VENOUS BLD VENIPUNCTURE: CPT

## 2020-09-18 PROCEDURE — 80048 BASIC METABOLIC PNL TOTAL CA: CPT

## 2020-09-18 PROCEDURE — 85025 COMPLETE CBC W/AUTO DIFF WBC: CPT

## 2020-09-18 PROCEDURE — 87641 MR-STAPH DNA AMP PROBE: CPT

## 2020-09-18 RX ORDER — ACETAMINOPHEN 325 MG/1
650 TABLET ORAL ONCE
Status: CANCELLED | OUTPATIENT
Start: 2020-09-18 | End: 2020-09-18

## 2020-09-18 RX ORDER — TRANEXAMIC ACID 650 1/1
1300 TABLET ORAL ONCE
Status: CANCELLED | OUTPATIENT
Start: 2020-09-18

## 2020-09-18 RX ORDER — CELECOXIB 200 MG/1
400 CAPSULE ORAL ONCE
Status: CANCELLED | OUTPATIENT
Start: 2020-09-18 | End: 2020-09-18

## 2020-09-18 NOTE — PROGRESS NOTES
[facility]   Preadmission Testing    Name: Terri Bustamante  : 1955  Patient Phone: [phone] (home)     Procedure Total Knee Left   Date of Procedure: 10/19/2020  Surgeon: No att. providers found    Ht:  5' 10.5\" (179.1 cm)  Wt: 243 lb 4.8 oz (110.4 kg)  Wt method: Actual    Allergies: No Known Allergies    Peanut allergy: No    Latex Allergy Screening Tool  Have you ever had a reaction to or been told by a physician that you have an allergy to latex or natural rubber?: No    Vitals:    20 1030   BP: (!) 146/80   Pulse: 57   Resp: 18   Temp: 97.8 °F (36.6 °C)   SpO2: 96%       No LMP for male patient. Do you take blood thinners? [x] Yes    [] No         Instructed to stop blood thinners prior to procedure? [x] Yes    [] No      [] N/A   Do you have sleep apnea? [] Yes    [x] No     Instructed to bring CPAP machine? [] Yes    [] No    [x] N/A   Do you have acid reflux ? [] Yes    [x] No     Do you have  hiatal hernia? [] Yes    [x] No    Do you ever experience motion sickness? [] Yes    [x] No     Have you had a respiratory infection or sore throat in last 4 weeks before surgery? [] Yes    [x] No     Do you have poorly controlled asthma or COPD? [] Yes    [x] No     Do you have a history of angina in the last month or symptomatic arrhythmia? [] Yes    [x] No     Do you have significant central nervous system disease? [] Yes    [x] No     Have you had an EKG, labs, or chest xray in last 12 months? If yes provide copies to anesthesia   [x] Yes    [] No       [x] Lab    [x] EKG    [] CXR     Have you had a stress test?     [] Yes    [x] No    When/where:     Was it normal?    [] Yes    [] No   Do you or your family have a history of Malignant Hyperthermia?    [] Yes    [x] No               PAT Call/Visit Questions  Person Interviewed: patient  Relationship to Patient: self  Surgery Location Verified: Yes  Patient Language: English  Medical History Reviewed: Yes  NPO Status Reinforced: Yes  Ride and Caregiver Arranged: Yes  Ride Caregiver Provider: Isabella Tian - girlfriend  Patient Knows to Bring Current Medications: Yes    Pre-AdmissionTesting Checklist  Patient has been to this health system before?: Yes  Does patient refuse blood?: No  Healthcare Directive: Yes, patient has an advance directive for healthcare treatment  Copy in Chart: No, copy requested from family   needed: No  Patient can read and write?: No  Meds-to-Beds: Does the patient want to have any new prescriptions delivered to bedside prior to discharge?: Not Assessed  History given by: Patient  Providing self care at home?: Yes  Discharge transport (for same day patients): Family    Patient instructed on the pre-operative, intra-operative, and post-operative process? Yes  Medication instructions reviewed with patient? Yes  Pre operative instruction sheet reviewed and given to patient in PAT?   Yes

## 2020-09-19 LAB
MRSA, DNA, NASAL: NORMAL
SPECIMEN DESCRIPTION: NORMAL

## 2020-10-14 ENCOUNTER — HOSPITAL ENCOUNTER (OUTPATIENT)
Dept: PHYSICAL THERAPY | Age: 65
Setting detail: THERAPIES SERIES
Discharge: HOME | End: 2020-10-14
Payer: COMMERCIAL

## 2020-10-14 DIAGNOSIS — Z01.818 PREOP TESTING: Primary | ICD-10-CM

## 2020-10-14 PROCEDURE — 97110 THERAPEUTIC EXERCISES: CPT

## 2020-10-14 PROCEDURE — 97116 GAIT TRAINING THERAPY: CPT

## 2020-10-14 NOTE — PROGRESS NOTES
Phone: [phone]        Fax: [address] [facility]  Physical Therapy  Gait Training/Discharge Summary  Date: 10/14/2020    Patient Name: Soraya Soria          : 1955  (59 y.o.)  [facility] #: [id number]    Attending Physician: Kale Brown MD    Diagnosis: Pre-op testing (Z01.818)    Reason for Referral:  [] Crutch Training   [x] [employer]   [] Other:    Objective Assessment:    [x]  Strength of uninvolved extremities are all within functional limits   []  Other:      Weight Bearing Status:  [] Right Extremity  [x] Left Extremity  [] NWB  [] PWB    [x] WBAT  [] TTWB    Treatment:  [x] Instructed in appropriate gait with crutches/walker  [x]   Crutches/walker fitted to patient at accurate height  [x] Instructed on gait training at level ground  [x] Instructed on gait training with use of stairs  [x] Instructed on sit to stand utilizing crutches/walker  []   Other:     Home Exercise Program - Instructed Patient:   [x] Handout given regarding LE ROM / strengthening exercises      Treatment Goals:  [x]  Met []  Not Met 10/14/2020   [x] Patient will be independent crutch/walker training    Treatment Plan:   [x]  Gait training, as noted above    [x]  Exercise education, as stated above     Rehab Potential: []  Poor   []  Fair   [x]  Good   []  Excellent     If there are any questions regarding the plan of care, please do not hesitate to contact the center.    Thank you for your referral.      Therapists Signature:  Jacy Harley PT, DPT            Date: 10/14/2020        To be completed by the referring physicians   [] Approve the treatment plan as indicated   []  Comments:   Physicians Signature:                        Date: 10/14/2020

## 2020-10-23 ENCOUNTER — HOSPITAL ENCOUNTER (OUTPATIENT)
Dept: PREADMISSION TESTING | Age: 65
Discharge: HOME | End: 2020-10-27
Payer: COMMERCIAL

## 2020-10-23 ENCOUNTER — HOSPITAL ENCOUNTER (OUTPATIENT)
Age: 65
Discharge: HOME | End: 2020-10-23
Payer: COMMERCIAL

## 2020-10-23 LAB
ABO/RH: NORMAL
ANTIBODY SCREEN: NEGATIVE
ARM BAND NUMBER: NORMAL
EXPIRATION DATE: NORMAL
SARS-COV-2, RAPID: NORMAL
SARS-COV-2: NORMAL
SARS-COV-2: NOT DETECTED
SOURCE: NORMAL

## 2020-10-23 PROCEDURE — C9803 HOPD COVID-19 SPEC COLLECT: HCPCS

## 2020-10-23 PROCEDURE — U0003 INFECTIOUS AGENT DETECTION BY NUCLEIC ACID (DNA OR RNA); SEVERE ACUTE RESPIRATORY SYNDROME CORONAVIRUS 2 (SARS-COV-2) (CORONAVIRUS DISEASE [COVID-19]), AMPLIFIED PROBE TECHNIQUE, MAKING USE OF HIGH THROUGHPUT TECHNOLOGIES AS DESCRIBED BY CMS-2020-01-R: HCPCS

## 2020-10-23 PROCEDURE — 86900 BLOOD TYPING SEROLOGIC ABO: CPT

## 2020-10-23 PROCEDURE — 86850 RBC ANTIBODY SCREEN: CPT

## 2020-10-23 PROCEDURE — 86901 BLOOD TYPING SEROLOGIC RH(D): CPT

## 2020-10-23 PROCEDURE — 36415 COLL VENOUS BLD VENIPUNCTURE: CPT

## 2020-10-28 ENCOUNTER — ANESTHESIA EVENT (OUTPATIENT)
Dept: OPERATING ROOM | Age: 65
End: 2020-10-28
Payer: COMMERCIAL

## 2020-10-29 ENCOUNTER — ANESTHESIA (OUTPATIENT)
Dept: OPERATING ROOM | Age: 65
End: 2020-10-29
Payer: COMMERCIAL

## 2020-10-29 ENCOUNTER — HOSPITAL ENCOUNTER (OUTPATIENT)
Age: 65
Setting detail: OBSERVATION
Discharge: HOME | End: 2020-10-30
Attending: ORTHOPAEDIC SURGERY | Admitting: ORTHOPAEDIC SURGERY
Payer: COMMERCIAL

## 2020-10-29 VITALS — DIASTOLIC BLOOD PRESSURE: 68 MMHG | SYSTOLIC BLOOD PRESSURE: 110 MMHG | OXYGEN SATURATION: 95 % | TEMPERATURE: 97.5 F

## 2020-10-29 PROBLEM — Z96.652 S/P TOTAL KNEE ARTHROPLASTY, LEFT: Status: ACTIVE | Noted: 2020-10-29

## 2020-10-29 PROCEDURE — 3700000001 HC ADD 15 MINUTES (ANESTHESIA): Performed by: ORTHOPAEDIC SURGERY

## 2020-10-29 PROCEDURE — 2500000003 HC RX 250 WO HCPCS: Performed by: NURSE ANESTHETIST, CERTIFIED REGISTERED

## 2020-10-29 PROCEDURE — 2580000003 HC RX 258: Performed by: NURSE ANESTHETIST, CERTIFIED REGISTERED

## 2020-10-29 PROCEDURE — 3700000000 HC ANESTHESIA ATTENDED CARE: Performed by: ORTHOPAEDIC SURGERY

## 2020-10-29 PROCEDURE — 88300 SURGICAL PATH GROSS: CPT

## 2020-10-29 PROCEDURE — 6360000002 HC RX W HCPCS: Performed by: ORTHOPAEDIC SURGERY

## 2020-10-29 PROCEDURE — 2500000003 HC RX 250 WO HCPCS: Performed by: ORTHOPAEDIC SURGERY

## 2020-10-29 PROCEDURE — 3600000015 HC SURGERY LEVEL 5 ADDTL 15MIN: Performed by: ORTHOPAEDIC SURGERY

## 2020-10-29 PROCEDURE — 7100000000 HC PACU RECOVERY - FIRST 15 MIN: Performed by: ORTHOPAEDIC SURGERY

## 2020-10-29 PROCEDURE — 6360000002 HC RX W HCPCS: Performed by: NURSE ANESTHETIST, CERTIFIED REGISTERED

## 2020-10-29 PROCEDURE — 6370000000 HC RX 637 (ALT 250 FOR IP): Performed by: ORTHOPAEDIC SURGERY

## 2020-10-29 PROCEDURE — C1713 ANCHOR/SCREW BN/BN,TIS/BN: HCPCS | Performed by: ORTHOPAEDIC SURGERY

## 2020-10-29 PROCEDURE — 2709999900 HC NON-CHARGEABLE SUPPLY: Performed by: ORTHOPAEDIC SURGERY

## 2020-10-29 PROCEDURE — 3600000005 HC SURGERY LEVEL 5 BASE: Performed by: ORTHOPAEDIC SURGERY

## 2020-10-29 PROCEDURE — 2580000003 HC RX 258: Performed by: ORTHOPAEDIC SURGERY

## 2020-10-29 PROCEDURE — G0378 HOSPITAL OBSERVATION PER HR: HCPCS

## 2020-10-29 PROCEDURE — 7100000001 HC PACU RECOVERY - ADDTL 15 MIN: Performed by: ORTHOPAEDIC SURGERY

## 2020-10-29 PROCEDURE — C1776 JOINT DEVICE (IMPLANTABLE): HCPCS | Performed by: ORTHOPAEDIC SURGERY

## 2020-10-29 PROCEDURE — 64447 NJX AA&/STRD FEMORAL NRV IMG: CPT | Performed by: NURSE ANESTHETIST, CERTIFIED REGISTERED

## 2020-10-29 DEVICE — COMPONENT PAT DIA35MM THK9MM STD KNEE VIVACIT-E CEM PERSONA: Type: IMPLANTABLE DEVICE | Site: KNEE | Status: FUNCTIONAL

## 2020-10-29 DEVICE — IMPL KNEE PSN FEM CR CMT CCR STD SZ8 L: Type: IMPLANTABLE DEVICE | Site: KNEE | Status: FUNCTIONAL

## 2020-10-29 DEVICE — EXTENSION STEM SZ G 5DEG L TIBL KNEE CEM NAT TIB: Type: IMPLANTABLE DEVICE | Site: KNEE | Status: FUNCTIONAL

## 2020-10-29 DEVICE — CEMENT BNE 40GM HI VISC RADPQ FOR REV SURG: Type: IMPLANTABLE DEVICE | Site: KNEE | Status: FUNCTIONAL

## 2020-10-29 DEVICE — IMPLANTABLE DEVICE: Type: IMPLANTABLE DEVICE | Site: KNEE | Status: FUNCTIONAL

## 2020-10-29 RX ORDER — PROPOFOL 10 MG/ML
INJECTION, EMULSION INTRAVENOUS PRN
Status: DISCONTINUED | OUTPATIENT
Start: 2020-10-29 | End: 2020-10-29 | Stop reason: SDUPTHER

## 2020-10-29 RX ORDER — SODIUM CHLORIDE, SODIUM LACTATE, POTASSIUM CHLORIDE, CALCIUM CHLORIDE 600; 310; 30; 20 MG/100ML; MG/100ML; MG/100ML; MG/100ML
INJECTION, SOLUTION INTRAVENOUS CONTINUOUS
Status: DISCONTINUED | OUTPATIENT
Start: 2020-10-29 | End: 2020-10-29

## 2020-10-29 RX ORDER — SODIUM CHLORIDE 0.9 % (FLUSH) 0.9 %
10 SYRINGE (ML) INJECTION EVERY 12 HOURS SCHEDULED
Status: DISCONTINUED | OUTPATIENT
Start: 2020-10-29 | End: 2020-10-30 | Stop reason: HOSPADM

## 2020-10-29 RX ORDER — ROPIVACAINE HYDROCHLORIDE 5 MG/ML
INJECTION, SOLUTION EPIDURAL; INFILTRATION; PERINEURAL PRN
Status: DISCONTINUED | OUTPATIENT
Start: 2020-10-29 | End: 2020-10-29 | Stop reason: SDUPTHER

## 2020-10-29 RX ORDER — AMIODARONE HYDROCHLORIDE 200 MG/1
200 TABLET ORAL DAILY
Status: DISCONTINUED | OUTPATIENT
Start: 2020-10-29 | End: 2020-10-30 | Stop reason: HOSPADM

## 2020-10-29 RX ORDER — FUROSEMIDE 40 MG/1
40 TABLET ORAL 2 TIMES DAILY
Status: DISCONTINUED | OUTPATIENT
Start: 2020-10-29 | End: 2020-10-30 | Stop reason: HOSPADM

## 2020-10-29 RX ORDER — HYDROCODONE BITARTRATE AND ACETAMINOPHEN 5; 325 MG/1; MG/1
1 TABLET ORAL
Status: DISCONTINUED | OUTPATIENT
Start: 2020-10-29 | End: 2020-10-29 | Stop reason: HOSPADM

## 2020-10-29 RX ORDER — ACETAMINOPHEN 325 MG/1
650 TABLET ORAL ONCE
Status: COMPLETED | OUTPATIENT
Start: 2020-10-29 | End: 2020-10-29

## 2020-10-29 RX ORDER — SODIUM CHLORIDE 9 MG/ML
INJECTION INTRAVENOUS PRN
Status: DISCONTINUED | OUTPATIENT
Start: 2020-10-29 | End: 2020-10-29 | Stop reason: SDUPTHER

## 2020-10-29 RX ORDER — TAMSULOSIN HYDROCHLORIDE 0.4 MG/1
0.4 CAPSULE ORAL EVERY EVENING
Status: DISCONTINUED | OUTPATIENT
Start: 2020-10-29 | End: 2020-10-30 | Stop reason: HOSPADM

## 2020-10-29 RX ORDER — SPIRONOLACTONE 25 MG/1
25 TABLET ORAL DAILY
Status: DISCONTINUED | OUTPATIENT
Start: 2020-10-29 | End: 2020-10-30 | Stop reason: HOSPADM

## 2020-10-29 RX ORDER — ATORVASTATIN CALCIUM 40 MG/1
40 TABLET, FILM COATED ORAL NIGHTLY
Status: DISCONTINUED | OUTPATIENT
Start: 2020-10-29 | End: 2020-10-30 | Stop reason: HOSPADM

## 2020-10-29 RX ORDER — CELECOXIB 200 MG/1
400 CAPSULE ORAL ONCE
Status: COMPLETED | OUTPATIENT
Start: 2020-10-29 | End: 2020-10-29

## 2020-10-29 RX ORDER — TRANEXAMIC ACID 100 MG/ML
INJECTION, SOLUTION INTRAVENOUS PRN
Status: DISCONTINUED | OUTPATIENT
Start: 2020-10-29 | End: 2020-10-29 | Stop reason: ALTCHOICE

## 2020-10-29 RX ORDER — DIMENHYDRINATE 50 MG/1
50 TABLET ORAL ONCE
Status: COMPLETED | OUTPATIENT
Start: 2020-10-29 | End: 2020-10-29

## 2020-10-29 RX ORDER — SENNA PLUS 8.6 MG/1
1 TABLET ORAL DAILY
Status: DISCONTINUED | OUTPATIENT
Start: 2020-10-29 | End: 2020-10-30 | Stop reason: HOSPADM

## 2020-10-29 RX ORDER — ACETAMINOPHEN 325 MG/1
650 TABLET ORAL EVERY 6 HOURS PRN
Status: DISCONTINUED | OUTPATIENT
Start: 2020-10-29 | End: 2020-10-30 | Stop reason: HOSPADM

## 2020-10-29 RX ORDER — LISINOPRIL 5 MG/1
5 TABLET ORAL DAILY
Status: DISCONTINUED | OUTPATIENT
Start: 2020-10-29 | End: 2020-10-30 | Stop reason: HOSPADM

## 2020-10-29 RX ORDER — BUPIVACAINE/KETOROLAC/KETAMINE 150-60/50
SYRINGE (ML) INJECTION
Status: DISCONTINUED
Start: 2020-10-29 | End: 2020-10-29

## 2020-10-29 RX ORDER — CEFAZOLIN SODIUM 2 G/50ML
2 SOLUTION INTRAVENOUS EVERY 8 HOURS
Status: COMPLETED | OUTPATIENT
Start: 2020-10-29 | End: 2020-10-30

## 2020-10-29 RX ORDER — SODIUM CHLORIDE 0.9 % (FLUSH) 0.9 %
10 SYRINGE (ML) INJECTION EVERY 12 HOURS SCHEDULED
Status: DISCONTINUED | OUTPATIENT
Start: 2020-10-29 | End: 2020-10-29 | Stop reason: HOSPADM

## 2020-10-29 RX ORDER — KETOROLAC TROMETHAMINE 30 MG/ML
INJECTION, SOLUTION INTRAMUSCULAR; INTRAVENOUS PRN
Status: DISCONTINUED | OUTPATIENT
Start: 2020-10-29 | End: 2020-10-29 | Stop reason: SDUPTHER

## 2020-10-29 RX ORDER — HYDROCODONE BITARTRATE AND ACETAMINOPHEN 5; 325 MG/1; MG/1
1 TABLET ORAL EVERY 4 HOURS PRN
Status: DISCONTINUED | OUTPATIENT
Start: 2020-10-29 | End: 2020-10-30 | Stop reason: HOSPADM

## 2020-10-29 RX ORDER — FENTANYL CITRATE 50 UG/ML
INJECTION, SOLUTION INTRAMUSCULAR; INTRAVENOUS PRN
Status: DISCONTINUED | OUTPATIENT
Start: 2020-10-29 | End: 2020-10-29 | Stop reason: SDUPTHER

## 2020-10-29 RX ORDER — SODIUM CHLORIDE, SODIUM LACTATE, POTASSIUM CHLORIDE, CALCIUM CHLORIDE 600; 310; 30; 20 MG/100ML; MG/100ML; MG/100ML; MG/100ML
INJECTION, SOLUTION INTRAVENOUS CONTINUOUS
Status: DISCONTINUED | OUTPATIENT
Start: 2020-10-29 | End: 2020-10-30 | Stop reason: HOSPADM

## 2020-10-29 RX ORDER — ONDANSETRON 2 MG/ML
4 INJECTION INTRAMUSCULAR; INTRAVENOUS
Status: DISCONTINUED | OUTPATIENT
Start: 2020-10-29 | End: 2020-10-29 | Stop reason: HOSPADM

## 2020-10-29 RX ORDER — PHENYLEPHRINE HYDROCHLORIDE 10 MG/ML
INJECTION INTRAVENOUS PRN
Status: DISCONTINUED | OUTPATIENT
Start: 2020-10-29 | End: 2020-10-29 | Stop reason: SDUPTHER

## 2020-10-29 RX ORDER — ACETAMINOPHEN 325 MG/1
650 TABLET ORAL ONCE
Status: DISCONTINUED | OUTPATIENT
Start: 2020-10-29 | End: 2020-10-29 | Stop reason: SDUPTHER

## 2020-10-29 RX ORDER — SODIUM CHLORIDE 0.9 % (FLUSH) 0.9 %
10 SYRINGE (ML) INJECTION PRN
Status: DISCONTINUED | OUTPATIENT
Start: 2020-10-29 | End: 2020-10-30 | Stop reason: HOSPADM

## 2020-10-29 RX ORDER — BUPIVACAINE/KETOROLAC/KETAMINE 150-60/50
SYRINGE (ML) INJECTION PRN
Status: DISCONTINUED | OUTPATIENT
Start: 2020-10-29 | End: 2020-10-29 | Stop reason: ALTCHOICE

## 2020-10-29 RX ORDER — SENNA AND DOCUSATE SODIUM 50; 8.6 MG/1; MG/1
1 TABLET, FILM COATED ORAL 2 TIMES DAILY
Status: DISCONTINUED | OUTPATIENT
Start: 2020-10-29 | End: 2020-10-30 | Stop reason: HOSPADM

## 2020-10-29 RX ORDER — DEXAMETHASONE SODIUM PHOSPHATE 10 MG/ML
INJECTION, SOLUTION INTRAMUSCULAR; INTRAVENOUS PRN
Status: DISCONTINUED | OUTPATIENT
Start: 2020-10-29 | End: 2020-10-29 | Stop reason: SDUPTHER

## 2020-10-29 RX ORDER — PROPOFOL 10 MG/ML
INJECTION, EMULSION INTRAVENOUS CONTINUOUS PRN
Status: DISCONTINUED | OUTPATIENT
Start: 2020-10-29 | End: 2020-10-29 | Stop reason: SDUPTHER

## 2020-10-29 RX ORDER — HYDROCODONE BITARTRATE AND ACETAMINOPHEN 5; 325 MG/1; MG/1
2 TABLET ORAL EVERY 6 HOURS PRN
Status: DISCONTINUED | OUTPATIENT
Start: 2020-10-29 | End: 2020-10-30 | Stop reason: HOSPADM

## 2020-10-29 RX ORDER — HYDROMORPHONE HCL 110MG/55ML
0.5 PATIENT CONTROLLED ANALGESIA SYRINGE INTRAVENOUS
Status: DISCONTINUED | OUTPATIENT
Start: 2020-10-29 | End: 2020-10-30 | Stop reason: HOSPADM

## 2020-10-29 RX ORDER — EPHEDRINE SULFATE/0.9% NACL/PF 50 MG/5 ML
SYRINGE (ML) INTRAVENOUS PRN
Status: DISCONTINUED | OUTPATIENT
Start: 2020-10-29 | End: 2020-10-29 | Stop reason: SDUPTHER

## 2020-10-29 RX ORDER — ACETAMINOPHEN 325 MG/1
650 TABLET ORAL EVERY 6 HOURS
Status: DISCONTINUED | OUTPATIENT
Start: 2020-10-29 | End: 2020-10-30 | Stop reason: HOSPADM

## 2020-10-29 RX ORDER — TRANEXAMIC ACID 650 1/1
1300 TABLET ORAL ONCE
Status: DISCONTINUED | OUTPATIENT
Start: 2020-10-29 | End: 2020-10-29

## 2020-10-29 RX ORDER — LIDOCAINE HYDROCHLORIDE 20 MG/ML
INJECTION, SOLUTION EPIDURAL; INFILTRATION; INTRACAUDAL; PERINEURAL PRN
Status: DISCONTINUED | OUTPATIENT
Start: 2020-10-29 | End: 2020-10-29 | Stop reason: SDUPTHER

## 2020-10-29 RX ORDER — SODIUM CHLORIDE 0.9 % (FLUSH) 0.9 %
10 SYRINGE (ML) INJECTION PRN
Status: DISCONTINUED | OUTPATIENT
Start: 2020-10-29 | End: 2020-10-29 | Stop reason: HOSPADM

## 2020-10-29 RX ADMIN — PHENYLEPHRINE HYDROCHLORIDE 200 MCG: 10 INJECTION INTRAVENOUS at 14:14

## 2020-10-29 RX ADMIN — DEXTROSE MONOHYDRATE 2 G: 50 INJECTION, SOLUTION INTRAVENOUS at 12:19

## 2020-10-29 RX ADMIN — Medication 10 MG: at 13:09

## 2020-10-29 RX ADMIN — PHENYLEPHRINE HYDROCHLORIDE 200 MCG: 10 INJECTION INTRAVENOUS at 14:22

## 2020-10-29 RX ADMIN — PHENYLEPHRINE HYDROCHLORIDE 50 MCG: 10 INJECTION INTRAVENOUS at 13:10

## 2020-10-29 RX ADMIN — PHENYLEPHRINE HYDROCHLORIDE 200 MCG: 10 INJECTION INTRAVENOUS at 14:46

## 2020-10-29 RX ADMIN — LIDOCAINE HYDROCHLORIDE 100 MG: 20 INJECTION, SOLUTION EPIDURAL; INFILTRATION; INTRACAUDAL; PERINEURAL at 12:40

## 2020-10-29 RX ADMIN — Medication 10 MG: at 13:19

## 2020-10-29 RX ADMIN — SODIUM CHLORIDE, POTASSIUM CHLORIDE, SODIUM LACTATE AND CALCIUM CHLORIDE: 600; 310; 30; 20 INJECTION, SOLUTION INTRAVENOUS at 12:03

## 2020-10-29 RX ADMIN — Medication 10 MG: at 13:06

## 2020-10-29 RX ADMIN — ACETAMINOPHEN 650 MG: 325 TABLET, FILM COATED ORAL at 17:58

## 2020-10-29 RX ADMIN — SODIUM CHLORIDE, POTASSIUM CHLORIDE, SODIUM LACTATE AND CALCIUM CHLORIDE: 600; 310; 30; 20 INJECTION, SOLUTION INTRAVENOUS at 14:09

## 2020-10-29 RX ADMIN — PHENYLEPHRINE HYDROCHLORIDE 200 MCG: 10 INJECTION INTRAVENOUS at 14:52

## 2020-10-29 RX ADMIN — PHENYLEPHRINE HYDROCHLORIDE 200 MCG: 10 INJECTION INTRAVENOUS at 15:19

## 2020-10-29 RX ADMIN — PHENYLEPHRINE HYDROCHLORIDE 50 MCG: 10 INJECTION INTRAVENOUS at 13:09

## 2020-10-29 RX ADMIN — FENTANYL CITRATE 50 MCG: 50 INJECTION, SOLUTION INTRAMUSCULAR; INTRAVENOUS at 12:14

## 2020-10-29 RX ADMIN — PHENYLEPHRINE HYDROCHLORIDE 100 MCG: 10 INJECTION INTRAVENOUS at 13:25

## 2020-10-29 RX ADMIN — PHENYLEPHRINE HYDROCHLORIDE 200 MCG: 10 INJECTION INTRAVENOUS at 14:35

## 2020-10-29 RX ADMIN — SODIUM CHLORIDE, POTASSIUM CHLORIDE, SODIUM LACTATE AND CALCIUM CHLORIDE: 600; 310; 30; 20 INJECTION, SOLUTION INTRAVENOUS at 20:49

## 2020-10-29 RX ADMIN — PHENYLEPHRINE HYDROCHLORIDE 100 MCG: 10 INJECTION INTRAVENOUS at 13:22

## 2020-10-29 RX ADMIN — PHENYLEPHRINE HYDROCHLORIDE 100 MCG: 10 INJECTION INTRAVENOUS at 13:19

## 2020-10-29 RX ADMIN — PHENYLEPHRINE HYDROCHLORIDE 200 MCG: 10 INJECTION INTRAVENOUS at 13:31

## 2020-10-29 RX ADMIN — Medication 10 MG: at 13:00

## 2020-10-29 RX ADMIN — PHENYLEPHRINE HYDROCHLORIDE 200 MCG: 10 INJECTION INTRAVENOUS at 14:38

## 2020-10-29 RX ADMIN — ATORVASTATIN CALCIUM 40 MG: 40 TABLET, FILM COATED ORAL at 20:48

## 2020-10-29 RX ADMIN — FENTANYL CITRATE 50 MCG: 50 INJECTION, SOLUTION INTRAMUSCULAR; INTRAVENOUS at 12:31

## 2020-10-29 RX ADMIN — CELECOXIB 400 MG: 200 CAPSULE ORAL at 12:02

## 2020-10-29 RX ADMIN — APIXABAN 5 MG: 5 TABLET, FILM COATED ORAL at 20:48

## 2020-10-29 RX ADMIN — DIMENHYDRINATE 50 MG: 50 TABLET ORAL at 12:02

## 2020-10-29 RX ADMIN — SODIUM CHLORIDE 10 ML: 9 INJECTION, SOLUTION INTRAMUSCULAR; INTRAVENOUS; SUBCUTANEOUS at 12:18

## 2020-10-29 RX ADMIN — PHENYLEPHRINE HYDROCHLORIDE 200 MCG: 10 INJECTION INTRAVENOUS at 13:46

## 2020-10-29 RX ADMIN — PHENYLEPHRINE HYDROCHLORIDE 200 MCG: 10 INJECTION INTRAVENOUS at 13:39

## 2020-10-29 RX ADMIN — PROPOFOL 60 MG: 10 INJECTION, EMULSION INTRAVENOUS at 12:40

## 2020-10-29 RX ADMIN — PROPOFOL 75 MCG/KG/MIN: 10 INJECTION, EMULSION INTRAVENOUS at 12:40

## 2020-10-29 RX ADMIN — PHENYLEPHRINE HYDROCHLORIDE 200 MCG: 10 INJECTION INTRAVENOUS at 14:31

## 2020-10-29 RX ADMIN — SENNOSIDES 8.6 MG: 8.6 TABLET, FILM COATED ORAL at 17:57

## 2020-10-29 RX ADMIN — METOPROLOL TARTRATE 25 MG: 25 TABLET, FILM COATED ORAL at 20:48

## 2020-10-29 RX ADMIN — PHENYLEPHRINE HYDROCHLORIDE 200 MCG: 10 INJECTION INTRAVENOUS at 13:35

## 2020-10-29 RX ADMIN — ROPIVACAINE HYDROCHLORIDE 30 ML: 5 INJECTION, SOLUTION EPIDURAL; INFILTRATION; PERINEURAL at 12:18

## 2020-10-29 RX ADMIN — DEXAMETHASONE SODIUM PHOSPHATE 10 MG: 10 INJECTION, SOLUTION INTRAMUSCULAR; INTRAVENOUS at 12:18

## 2020-10-29 RX ADMIN — PHENYLEPHRINE HYDROCHLORIDE 100 MCG: 10 INJECTION INTRAVENOUS at 13:28

## 2020-10-29 RX ADMIN — KETOROLAC TROMETHAMINE 30 MG: 30 INJECTION, SOLUTION INTRAMUSCULAR; INTRAVENOUS at 15:40

## 2020-10-29 RX ADMIN — SODIUM CHLORIDE, POTASSIUM CHLORIDE, SODIUM LACTATE AND CALCIUM CHLORIDE: 600; 310; 30; 20 INJECTION, SOLUTION INTRAVENOUS at 13:00

## 2020-10-29 RX ADMIN — SODIUM CHLORIDE, POTASSIUM CHLORIDE, SODIUM LACTATE AND CALCIUM CHLORIDE: 600; 310; 30; 20 INJECTION, SOLUTION INTRAVENOUS at 17:59

## 2020-10-29 RX ADMIN — PHENYLEPHRINE HYDROCHLORIDE 200 MCG: 10 INJECTION INTRAVENOUS at 14:40

## 2020-10-29 RX ADMIN — CEFAZOLIN SODIUM 2 G: 2 SOLUTION INTRAVENOUS at 17:57

## 2020-10-29 RX ADMIN — ACETAMINOPHEN 650 MG: 325 TABLET, FILM COATED ORAL at 12:02

## 2020-10-29 RX ADMIN — DOCUSATE SODIUM 50 MG AND SENNOSIDES 8.6 MG 1 TABLET: 8.6; 5 TABLET, FILM COATED ORAL at 20:48

## 2020-10-29 RX ADMIN — Medication 10 MG: at 12:57

## 2020-10-29 ASSESSMENT — PAIN SCALES - GENERAL
PAINLEVEL_OUTOF10: 0

## 2020-10-29 NOTE — PROGRESS NOTES
Tiff Adair, pharmacist to discuss TXA order and due to patient's history the decision was made to cancel order. Updated Dr. Sandra Pastor. OK to d/c PO TXA order. Topical TXA ordered for during surgery.

## 2020-10-29 NOTE — ANESTHESIA PROCEDURE NOTES
Peripheral Block    Patient location during procedure: pre-op  Start time: 10/29/2020 12:14 PM  End time: 10/29/2020 12:18 PM  Staffing  Resident/CRNA: SHELBIE Vang CRNA  Other anesthesia staff: SHELBIE Jenkins CRNA  Performed: resident/CRNA and other anesthesia staff   Preanesthetic Checklist  Completed: patient identified, site marked, surgical consent, pre-op evaluation, timeout performed, IV checked, risks and benefits discussed, monitors and equipment checked, anesthesia consent given, oxygen available and patient being monitored  Peripheral Block  Patient position: supine  Prep: ChloraPrep  Patient monitoring: continuous pulse ox and IV access  Block type: Saphenous and iPacks  Laterality: left  Injection technique: single-shot  Procedures: ultrasound guided  Local infiltration: ropivacaine and decadron (See MAR for details.)  Infiltration strength: 0.5 %  Dose: 30 mL  Provider prep: mask and sterile gloves  Local infiltration: ropivacaine and decadron (See MAR for details.)  Needle  Needle type: combined needle/nerve stimulator   Needle gauge: 22 G  Needle length: 8 cm  Needle localization: ultrasound guidance  Assessment  Injection assessment: negative aspiration for heme, no paresthesia on injection and local visualized surrounding nerve on ultrasound  Paresthesia pain: none  Slow fractionated injection: yes  Hemodynamics: stable  Reason for block: post-op pain management and at surgeon's request

## 2020-10-29 NOTE — ANESTHESIA PROCEDURE NOTES
Spinal Block    Patient location during procedure: OR  Start time: 10/29/2020 12:28 PM  End time: 10/29/2020 12:33 PM  Reason for block: primary anesthetic  Staffing  Resident/CRNA: SHELBIE Rivera CRNA  Performed: resident/CRNA   Preanesthetic Checklist  Completed: patient identified, site marked, surgical consent, pre-op evaluation, timeout performed, IV checked, risks and benefits discussed, monitors and equipment checked, anesthesia consent given, oxygen available and patient being monitored  Spinal Block  Patient position: sitting  Prep: ChloraPrep and site prepped and draped  Patient monitoring: continuous pulse ox and frequent blood pressure checks  Approach: midline  Location: L4/L5  Procedures: anatomy guided.   Provider prep: mask and sterile gloves  Local infiltration: lidocaine  Agent: bupivacaine  Dose: 2  Dose: 2  Needle  Needle type: Sprotte Tip   Needle gauge: 22 G  Needle length: 3.5 in  Kit: Charity Jayashree  Lot number: [id number]  Expiration date: 10/31/2020  Assessment  Sensory level: T4  Swirl obtained: Yes  CSF: clear  Attempts: 1  Hemodynamics: stable

## 2020-10-29 NOTE — BRIEF OP NOTE
Brief Postoperative Note      Patient: Vandana Barney  YOB: 1955  MRN: [id number]    Date of Procedure: 10/29/2020    Pre-Op Diagnosis: LEFT KNEE ARTHRITIS  FB left tibia    Post-Op Diagnosis: Same       Procedure(s):  KNEE TOTAL ARTHROPLASTY-FB REMOVAL TIBIA    Surgeon(s):  Lynette Goldsmith MD    Assistant:  First Assistant: Amber Piña RN    Anesthesia: Choice    Estimated Blood Loss (mL): 834 cc    Complications: None    Specimens:   ID Type Source Tests Collected by Time Destination   A :  Tissue Leg SURGICAL PATHOLOGY Lynette Goldsmith MD 10/29/2020 1600        Implants:  Implant Name Type Inv. Item Serial No.  Lot No. LRB No. Used Action   CEMENT BONE R 1X40 US Cement CEMENT BONE R 1X40 US  [employer] [id number] Left 1 Implanted   CEMENT BONE R 1X40 US Cement CEMENT BONE R 1X40 US  [employer] [id number] Left 1 Implanted   IMPL KNEE PSN TIB STM 5 DEG SZ GL Knee IMPL KNEE PSN TIB STM 5 DEG SZ GL  [employer] [id number] Left 1 Implanted   IMPL KNEE PSN FEM CR CMT CCR STD SZ8 L Knee IMPL KNEE PSN FEM CR CMT CCR STD SZ8 L  [employer] [id number] Left 1 Implanted   IMPL KNEE PATELLA ALL POLY PERSONA Knee IMPL KNEE PATELLA ALL POLY PERSONA  [employer] [id number] Left 1 Implanted   IMPL KNEE PSN MC VE ASF L 16MM 8 11 GH Knee IMPL KNEE PSN MC VE ASF L 16MM 8 11 GH  [employer] [id number] Left 1 Implanted         Drains: 2  Closed/Suction Drain Left; Anterior Knee Accordion 10 Urdu (Active)       Urethral Catheter Non-latex 16 fr (Active)       Findings:     Electronically signed by Jonatan No MD on 10/29/2020 at 4:07 PM

## 2020-10-29 NOTE — ANESTHESIA PRE PROCEDURE
Department of Anesthesiology  Preprocedure Note       Name:  Hilary Clark   Age:  59 y.o.  :  1955                                          MRN:  [id number]         Date:  10/29/2020      Surgeon: Austyn Vance):  Efrain Atkinson MD    Procedure: Procedure(s):  KNEE TOTAL ARTHROPLASTY-FB REMOVAL TIBIA    Medications prior to admission:   Prior to Admission medications    Medication Sig Start Date End Date Taking?  Authorizing Provider   tamsulosin (FLOMAX) 0.4 MG capsule Take 1 capsule by mouth every evening 20   Alexandre Mcgraw MD   Glucosamine-Chondroit-Vit C-Mn (GLUCOSAMINE CHONDR 500 COMPLEX PO) Take by mouth    Historical Provider, MD   apixaban (ELIQUIS) 5 MG TABS tablet Take 5 mg by mouth 2 times daily    Historical Provider, MD   senna (SENOKOT) 8.6 MG tablet Take 1 tablet by mouth daily    Historical Provider, MD   aspirin 81 MG chewable tablet Take 81 mg by mouth daily    Historical Provider, MD   amiodarone (CORDARONE) 200 MG tablet Take 200 mg by mouth daily    Historical Provider, MD   atorvastatin (LIPITOR) 40 MG tablet Take 40 mg by mouth daily    Historical Provider, MD   furosemide (LASIX) 40 MG tablet Take 40 mg by mouth 2 times daily    Historical Provider, MD   lisinopril (PRINIVIL;ZESTRIL) 5 MG tablet Take 5 mg by mouth daily    Historical Provider, MD   metoprolol tartrate (LOPRESSOR) 25 MG tablet Take 25 mg by mouth 2 times daily    Historical Provider, MD   spironolactone (ALDACTONE) 25 MG tablet Take 25 mg by mouth daily    Historical Provider, MD       Current medications:    Current Facility-Administered Medications   Medication Dose Route Frequency Provider Last Rate Last Dose    lactated ringers infusion   Intravenous Continuous Efrain Atkinson MD        sodium chloride flush 0.9 % injection 10 mL  10 mL Intravenous 2 times per day Efrain Atkinson MD        sodium chloride flush 0.9 % injection 10 mL  10 mL Intravenous PRN Efrain Atkinson MD        acetaminophen (TYLENOL) tablet 650 mg  650 mg Oral Once Wei Morris MD        dimenhyDRINATE (DRAMAMINE) tablet 50 mg  50 mg Oral Once Wei Morris MD        ceFAZolin (ANCEF) 2 g in dextrose 5 % 100 mL IVPB  2 g Intravenous Once Wei Morris MD        celecoxib (CELEBREX) capsule 400 mg  400 mg Oral Once Wei Morris MD        tranexamic acid (LYSTEDA) tablet 1,300 mg  1,300 mg Oral Once Wei Morris MD           Allergies:  No Known Allergies    Problem List:    Patient Active Problem List   Diagnosis Code    BPH with obstruction/lower urinary tract symptoms N40.1, N13.8    Urinary retention R33.9       Past Medical History:        Diagnosis Date    Arthritis     CAD (coronary artery disease)     [facility]estive [facility]) [facility] [address]     Hyperlipidemia     Hypertension     MI [facility] infarction) ([facility]        Past Surgical History:        Procedure Laterality Date    COLONOSCOPY      2015    HERNIA REPAIR      JOINT REPLACEMENT Right 08/2019    knee       Social History:    Social History     Tobacco Use    Smoking status: Never Smoker    Smokeless tobacco: Current User     Types: Snuff   Substance Use Topics    Alcohol use:  Yes     Alcohol/week: 1.0 standard drinks     Types: 1 Glasses of wine per week     Comment: beer                                Ready to quit: Not Answered  Counseling given: Not Answered      Vital Signs (Current):   Vitals:    10/29/20 1116   Weight: 243 lb (110.2 kg)   Height: 5' 10.5\" (1.791 m)                                              BP Readings from Last 3 Encounters:   09/18/20 (!) 146/80   07/27/20 110/68   01/22/20 122/68       NPO Status: Time of last liquid consumption: 2230                        Time of last solid consumption: 2000                        Date of last liquid consumption: 10/28/20                        Date of last solid food consumption: 10/28/20    BMI:   Wt Readings from Last 3 Encounters:   10/29/20 243 lb (110.2 kg)   09/18/20 243 lb 4.8 oz (110.4 kg)   07/27/20 246 lb (111.6 kg)     Body mass index is 34.37 kg/m². CBC:   Lab Results   Component Value Date    WBC 6.4 09/18/2020    RBC 4.63 09/18/2020    HGB 13.5 09/18/2020    HCT 43.5 09/18/2020    MCV 94.0 09/18/2020    RDW 14.2 09/18/2020     09/18/2020       CMP:   Lab Results   Component Value Date     09/18/2020    K 5.0 09/18/2020    CL 98 09/18/2020    CO2 25 09/18/2020    BUN 18 09/18/2020    CREATININE 0.95 09/18/2020    GFRAA >60 09/18/2020    LABGLOM >60 09/18/2020    GLUCOSE 93 09/18/2020    CALCIUM 9.2 09/18/2020       POC Tests: No results for input(s): POCGLU, POCNA, POCK, POCCL, POCBUN, POCHEMO, POCHCT in the last 72 hours.     Coags: No results found for: PROTIME, INR, APTT    HCG (If Applicable): No results found for: PREGTESTUR, PREGSERUM, HCG, HCGQUANT     ABGs: No results found for: PHART, PO2ART, VFI4KQY, FBY6UAI, BEART, T6RDQZXY     Type & Screen (If Applicable):  No results found for: LABABO, LABRH    Drug/Infectious Status (If Applicable):  No results found for: HIV, HEPCAB    COVID-19 Screening (If Applicable):   Lab Results   Component Value Date    COVID19 Not Detected 10/23/2020         Anesthesia Evaluation  Patient summary reviewed and Nursing notes reviewed no history of anesthetic complications:   Airway: Mallampati: II  TM distance: >3 FB   Neck ROM: full  Mouth opening: > = 3 FB Dental:    (+) partials  Comment: some crowns at molars, broken crown #5    Pulmonary:Negative Pulmonary ROS and normal exam  breath sounds clear to auscultation                             Cardiovascular:  Exercise tolerance: good (>4 METS),   (+) hypertension:, past MI: no interval change, CAD: no interval change, dysrhythmias: atrial fibrillation,     (-)  CHF      Rhythm: regular  Rate: normal                 ROS comment: MI 2019      Neuro/Psych:   Negative Neuro/Psych ROS              GI/Hepatic/Renal: Neg GI/Hepatic/Renal ROS            Endo/Other:    (+) :

## 2020-10-29 NOTE — ANESTHESIA POSTPROCEDURE EVALUATION
Department of Anesthesiology  Postprocedure Note    Patient: Telma Butterfield  MRN: [id number]  YOB: 1955  Date of evaluation: 10/29/2020  Time:  6:06 PM     Procedure Summary     Date:  10/29/20 Room / Location:  [address] [place] [address]    Anesthesia Start:  4210 Anesthesia Stop:  3118    Procedure:  KNEE TOTAL ARTHROPLASTY-FB REMOVAL TIBIA (Left ) Diagnosis:  (LEFT KNEE ARTHRITIS)    Surgeon:  Maritza Laura MD Responsible Provider:  SHELBIE Keys CRNA    Anesthesia Type:  spinal ASA Status:  3          Anesthesia Type: spinal    Angeli Phase I: Angeli Score: 10    Angeli Phase II:      Last vitals: Reviewed and per EMR flowsheets.        Anesthesia Post Evaluation    Patient location during evaluation: bedside  Patient participation: complete - patient participated  Level of consciousness: awake and alert  Pain score: 0  Nausea & Vomiting: no vomiting and no nausea  Complications: no  Cardiovascular status: blood pressure returned to baseline  Respiratory status: acceptable  Hydration status: euvolemic

## 2020-10-30 VITALS
BODY MASS INDEX: 34.89 KG/M2 | WEIGHT: 249.2 LBS | HEART RATE: 66 BPM | OXYGEN SATURATION: 97 % | DIASTOLIC BLOOD PRESSURE: 67 MMHG | HEIGHT: 71 IN | TEMPERATURE: 97.7 F | RESPIRATION RATE: 18 BRPM | SYSTOLIC BLOOD PRESSURE: 111 MMHG

## 2020-10-30 LAB
HCT VFR BLD CALC: 36 % (ref 40.7–50.3)
HEMOGLOBIN: 11 G/DL (ref 13–17)

## 2020-10-30 PROCEDURE — G0378 HOSPITAL OBSERVATION PER HR: HCPCS

## 2020-10-30 PROCEDURE — 85018 HEMOGLOBIN: CPT

## 2020-10-30 PROCEDURE — 6370000000 HC RX 637 (ALT 250 FOR IP): Performed by: ORTHOPAEDIC SURGERY

## 2020-10-30 PROCEDURE — 2580000003 HC RX 258: Performed by: ORTHOPAEDIC SURGERY

## 2020-10-30 PROCEDURE — 6360000002 HC RX W HCPCS: Performed by: ORTHOPAEDIC SURGERY

## 2020-10-30 PROCEDURE — 36415 COLL VENOUS BLD VENIPUNCTURE: CPT

## 2020-10-30 PROCEDURE — 97165 OT EVAL LOW COMPLEX 30 MIN: CPT

## 2020-10-30 PROCEDURE — 85014 HEMATOCRIT: CPT

## 2020-10-30 PROCEDURE — 97161 PT EVAL LOW COMPLEX 20 MIN: CPT

## 2020-10-30 PROCEDURE — 97535 SELF CARE MNGMENT TRAINING: CPT

## 2020-10-30 RX ORDER — HYDROCODONE BITARTRATE AND ACETAMINOPHEN 5; 325 MG/1; MG/1
1-2 TABLET ORAL EVERY 4 HOURS PRN
Qty: 40 TABLET | Refills: 0 | Status: SHIPPED | OUTPATIENT
Start: 2020-10-30 | End: 2020-11-06

## 2020-10-30 RX ADMIN — SODIUM CHLORIDE, POTASSIUM CHLORIDE, SODIUM LACTATE AND CALCIUM CHLORIDE: 600; 310; 30; 20 INJECTION, SOLUTION INTRAVENOUS at 09:46

## 2020-10-30 RX ADMIN — LISINOPRIL 5 MG: 5 TABLET ORAL at 08:22

## 2020-10-30 RX ADMIN — AMIODARONE HYDROCHLORIDE 200 MG: 200 TABLET ORAL at 08:22

## 2020-10-30 RX ADMIN — SPIRONOLACTONE 25 MG: 25 TABLET, FILM COATED ORAL at 08:22

## 2020-10-30 RX ADMIN — ACETAMINOPHEN 650 MG: 325 TABLET, FILM COATED ORAL at 05:32

## 2020-10-30 RX ADMIN — METOPROLOL TARTRATE 25 MG: 25 TABLET, FILM COATED ORAL at 08:22

## 2020-10-30 RX ADMIN — DOCUSATE SODIUM 50 MG AND SENNOSIDES 8.6 MG 1 TABLET: 8.6; 5 TABLET, FILM COATED ORAL at 08:22

## 2020-10-30 RX ADMIN — SENNOSIDES 8.6 MG: 8.6 TABLET, FILM COATED ORAL at 08:22

## 2020-10-30 RX ADMIN — CEFAZOLIN SODIUM 2 G: 2 SOLUTION INTRAVENOUS at 02:13

## 2020-10-30 RX ADMIN — ACETAMINOPHEN 650 MG: 325 TABLET, FILM COATED ORAL at 00:13

## 2020-10-30 RX ADMIN — APIXABAN 5 MG: 5 TABLET, FILM COATED ORAL at 08:21

## 2020-10-30 RX ADMIN — FUROSEMIDE 40 MG: 40 TABLET ORAL at 08:22

## 2020-10-30 RX ADMIN — ACETAMINOPHEN 650 MG: 325 TABLET, FILM COATED ORAL at 12:27

## 2020-10-30 ASSESSMENT — PAIN SCALES - GENERAL
PAINLEVEL_OUTOF10: 0
PAINLEVEL_OUTOF10: 1
PAINLEVEL_OUTOF10: 0

## 2020-10-30 NOTE — PROGRESS NOTES
Physical Therapy    Facility/Department: [address] [place] [address] MED SURG  Initial Assessment    NAME: Isabell Paz  : 1955  MRN: [id number]    Date of Service: 10/30/2020    Discharge Recommendations:  Continue to assess pending progress, Home with assist PRN, Outpatient PT        Assessment   Body structures, Functions, Activity limitations: Decreased functional mobility ; Decreased ROM; Decreased strength;Decreased balance  Assessment: PT evaluation completed. Patient completed supine to sit and sit to stands with SBAx1. Patient was able to ambulate with RW with SBAx1 10 feet with safe usage of rolling walker. Patient would benefit from continued therapy in order to address deficits in strength, balance, ROM and gait. Treatment Diagnosis: difficulty walking  Prognosis: Good  Decision Making: Low Complexity  PT Education: PT Role;Weight-bearing Education;Home Exercise Program  REQUIRES PT FOLLOW UP: Yes  Activity Tolerance  Activity Tolerance: Patient Tolerated treatment well       Patient Diagnosis(es): There were no encounter diagnoses. has a past medical history of Arthritis, CAD (coronary artery disease), CHF (congestive heart failure) ([facility] [address] Hyperlipidemia, Hypertension, and MI (myocardial infarction) ([facility] [address] has a past surgical history that includes hernia repair; joint replacement (Right, 2019); Colonoscopy; and Total knee arthroplasty (Left, 10/29/2020).     Restrictions  Restrictions/Precautions  Restrictions/Precautions: Weight Bearing, General Precautions, Fall Risk  Lower Extremity Weight Bearing Restrictions  Left Lower Extremity Weight Bearing: Weight Bearing As Tolerated  Vision/Hearing        Subjective  General  Chart Reviewed: Yes  Patient assessed for rehabilitation services?: Yes  Family / Caregiver Present: No  Referring Practitioner: Richa Light MD  Referral Date : 10/29/20  Diagnosis: S/P total knee arthoplasty left  Subjective  Subjective: patient denies pain  Pain Screening  Patient Currently in Pain: Denies          Orientation     Social/Functional History  Social/Functional History  Lives With: Other (comment)(patient lives with girlfriend)  Type of Home: Trailer  Home Layout: One level  Home Access: Stairs to enter with rails  Home Equipment: Rolling walker, Cane  ADL Assistance: Independent  Ambulation Assistance: Independent  Transfer Assistance: Independent  Additional Comments: patient independent with ADLs and ambulation prior to surgery  Cognition        Objective          AROM RLE (degrees)  RLE AROM: WFL  AROM LLE (degrees)  LLE General AROM: knee extension lacking 10-15 degrees and knee flexion 80-85 degrees  Strength RLE  Strength RLE: WFL  Strength LLE  Comment: quad 3+/5 and ankle 4-/5     Sensation  Overall Sensation Status: WFL  Bed mobility  Bridging: Independent  Rolling to Left: Independent  Rolling to Right: Independent  Supine to Sit: Stand by assistance  Transfers  Sit to Stand: Stand by assistance  Stand to sit: Stand by assistance  Ambulation  Ambulation?: Yes  WB Status: L LE WBAT  Ambulation 1  Surface: level tile  Device: Rolling Walker  Assistance: Stand by assistance  Gait Deviations: Slow Roxane  Distance: 10 feet  Comments: patient demonstrated safe usage of walker with transfers and ambulation     Balance  Sitting - Static: Good  Sitting - Dynamic: Good  Standing - Static: Fair;+  Standing - Dynamic: [address]  Times per week: 7x/week  Times per day: Twice a day(1x/day on weekends)  Current Treatment Recommendations: Strengthening, ROM, Balance Training, Functional Mobility Training, Transfer Training, Stair training, Gait Training, Neuromuscular Re-education, Manual Therapy - Soft Tissue Mobilization, Home Exercise Program, Safety Education & Training, Patient/Caregiver Education & Training  Safety Devices  Type of devices: Left in chair, Call light within reach(patient not on bed alarm prior to PT arrival)    AM-PAC Score     AM-PAC Inpatient Mobility without Stair Climbing Raw Score : 16 (10/30/20 0800)  AM-PAC Inpatient without Stair Climbing T-Scale Score : 45.54 (10/30/20 0800)  Mobility Inpatient CMS 0-100% Score: 40.64 (10/30/20 0800)  Mobility Inpatient without Stair CMS G-Code Modifier : CK (10/30/20 0800)       Goals  Short term goals  Time Frame for Short term goals: 10 visits  Short term goal 1: Patient will demonstrate the ability to complete bed mobility and transfers independently in order to ease ADLs  Short term goal 2: Patient will demonstrate the ability to ambulate 150feet with RW with modified independent assistance in order to ease functional mobility  Short term goal 3: Patient will demonstrate the ability to ascend/descend 2 steps with bilateral handrail with modified independent assistance in order to safely enter/exit the home  Short term goal 4: Patient will tolerate 35-45' ther-ex in order to increase endurance and ease ADLs       Therapy Time   Individual Concurrent Group Co-treatment   Time In 0735         Time Out 0750         Minutes 15                 Michelle Madera, PT, DPT

## 2020-10-30 NOTE — PLAN OF CARE
Problem: Falls - Risk of:  Goal: Will remain free from falls  Description: Will remain free from falls  Outcome: Ongoing  Note: Call light in reach at all times, frequent checks, bed in lowest position, wheels of bed and chair locked, non skid footwear on, appropriate transfer techniques, personal items within reach, walkways free of obstructions, fall risk armband and sign displayed, Yepez fall risk score per protocol. No falls this shift, will continue to monitor. Problem: Infection - Surgical Site:  Goal: Will show no infection signs and symptoms  Description: Will show no infection signs and symptoms  Outcome: Ongoing  Note: Monitor lab work. IV antibiotics per orders. Will continue to monitor. Problem: Pain - Acute:  Goal: Pain level will decrease  Description: Pain level will decrease  Outcome: Ongoing  Note: Pain assessed every four hours and as needed using 0-10 pain scale. Pt educated on scale and uses scale appropriately. Encourage pt to notify staff of pain before pain becomes uncontrollable. Correlate periods of heavy activity after pain medication administration.  Use pharmacological and non pharmacological methods for pain relief such as: warm blankets, ice, television, reading, or rest.

## 2020-10-30 NOTE — CONSULTS
gallop  PULM:  CTA, no wheezes, rales or rhonchi  ABD:    Bowels sounds normal.  Abdomen is soft. No distention. No tenderness. EXT:   no  edema. Pedal pulses are intact. No calf tenderness  NEURO: Motor and sensory are intact  SKIN:  Incision is clean, dry and intact. No surrounding erythema.  -----------------------------------------------------------------  Diagnostic Data:    Lab Results   Component Value Date    WBC 6.4 09/18/2020    HGB 11.0 (L) 10/30/2020    MCV 94.0 09/18/2020     09/18/2020      Lab Results   Component Value Date    GLUCOSE 93 09/18/2020    BUN 18 09/18/2020    CREATININE 0.95 09/18/2020     (L) 09/18/2020    K 5.0 09/18/2020    CALCIUM 9.2 09/18/2020    CL 98 09/18/2020    CO2 25 09/18/2020         ASSESSMENT:      · Post-op medical management of: Active Problems:    S/P total knee arthroplasty, left  Resolved Problems:    * No resolved hospital problems.  *    · s/p left total Knee arthroplasty    PLAN:  · Continue current physical and occupational therapy  · Continue DVT prophylaxis -- Eliquis  · Discharge planning  -- Home today  · Cleared for DC from Medical  · Pain control    SHELBIE Andrews, NP-C  10/30/2020, 7:37 AM

## 2020-10-30 NOTE — PROGRESS NOTES
Patient resting in chair, watching TV. He requested for a double meal tray of the same meal so that his girlfriend could have a tray as well. Iris Ruiz RN spoke with dietary to place the order. Patient denies other needs at this time.

## 2020-10-30 NOTE — PROGRESS NOTES
Dressing changed to left knee incision per orders at this time. Old dressing of ACE wrap, 4x4s, ABDs, and kerlix removed, new dressing of a padded tegaderm applied. Incision is clean, dry and intact with scant drainage to old dressing. Compression stocking applied to left leg as well. Will review discharge instructions with patient shortly.

## 2020-10-30 NOTE — PROGRESS NOTES
Patient resting in cart with girlfriend in the room, denies any needs at this time. Measured and documented urine output.

## 2020-10-30 NOTE — DISCHARGE SUMMARY
Physician Discharge Summary     Patient ID:  Hilary Clark  [id number]  1955    Admit date: 10/29/2020    Discharge date and time:      Admitting Physician: Bonny Alanis MD     Primary Care Physician: Benny Gomez MD    Primary Discharge Diagnoses: DJD left knee     Additional Diagnoses:       Diagnosis Date    Arthritis     CAD (coronary artery disease)     CHF (congestive heart failure) ([address]     Hyperlipidemia     Hypertension     MI [facility])        Hospital Course: The patient was admitted for the above. He was treated with surgery and improved over the course of his hospitalization. Consults: none    Procedures: left knee total replacement    Complications: none    Discharge Condition: good    Post op anemia:  acute blood loss    Lab Results   Component Value Date    HGB 11.0 10/30/2020    HGB 13.5 09/18/2020    HGB 13.1 10/15/2019       Estimate Blood Loss:       BMI: Body mass index is 35.25 kg/m².  obesity     Disposition: Home    Patient Instructions:    [facility] Medication Instructions [id number]    Printed on:10/30/20 1151   Medication Information                      amiodarone (CORDARONE) 200 MG tablet  Take 200 mg by mouth daily             apixaban (ELIQUIS) 5 MG TABS tablet  Take 5 mg by mouth 2 times daily             aspirin 81 MG chewable tablet  Take 81 mg by mouth daily             atorvastatin (LIPITOR) 40 MG tablet  Take 40 mg by mouth daily             furosemide (LASIX) 40 MG tablet  Take 40 mg by mouth 2 times daily             Glucosamine-Chondroit-Vit C-Mn (GLUCOSAMINE CHONDR 500 COMPLEX PO)  Take by mouth             lisinopril (PRINIVIL;ZESTRIL) 5 MG tablet  Take 5 mg by mouth daily             metoprolol tartrate (LOPRESSOR) 25 MG tablet  Take 25 mg by mouth 2 times daily             senna (SENOKOT) 8.6 MG tablet  Take 1 tablet by mouth daily             spironolactone (ALDACTONE) 25 MG tablet  Take 25 mg by mouth daily             tamsulosin (FLOMAX) 0.4 MG capsule  Take 1 capsule by mouth every evening               Activity: Physical Therapy  Wound Care: keep wound clean and dry, shower after 4 days if no drainage  Other:      Follow-up with Cong Mccormick in 2 weeks.     Signed:  Cong Mccormick M.D.  10/30/2020  11:51 AM

## 2020-10-30 NOTE — PLAN OF CARE
Problem: Falls - Risk of:  Goal: Will remain free from falls  Description: Will remain free from falls  10/30/2020 0758 by Batsheva Villegas RN  Outcome: Ongoing  Note: Fall assessment completed daily. Bed in lowest position. Call light within reach. Falling star posted to outside of door. Fall risk sticker in place on ID band. Side rails up 2/4. Bed alarm on for safety. Patient ambulates with a standby assist. Will continue to monitor. Problem: Falls - Risk of:  Goal: Absence of physical injury  Description: Absence of physical injury  Outcome: Ongoing  Note: Patient is free from physical injury. Problem: Discharge Planning:  Goal: Discharged to appropriate level of care  Description: Discharged to appropriate level of care  Outcome: Ongoing  Note: Case management working with the patient. Problem: Mobility - Impaired:  Goal: Mobility will improve  Description: Mobility will improve  Outcome: Ongoing  Note: PT/OT working with the patient. Problem: Infection - Surgical Site:  Goal: Will show no infection signs and symptoms  Description: Will show no infection signs and symptoms  10/30/2020 0758 by Batsheva Villegas RN  Outcome: Ongoing  Note: Patient has been afebrile thus far this shift. No other s/s of infection present. Will continue to monitor. Problem: Pain - Acute:  Goal: Pain level will decrease  Description: Pain level will decrease  10/30/2020 0758 by Batsheva Villegas RN  Outcome: Ongoing  Note: Pain assessed every 4 hours. Patient is able to communicate with staff the need for pain interventions. Patient currently not complaining of any pain. Pain medications available as needed. Will continue to monitor.

## 2020-10-30 NOTE — PROGRESS NOTES
Patient hemavac removed, pt tolerated well. Dressing that was removed from the site was dry and clean. Site of insertion has slight bleeding after removal, otherwise wound is not swollen or red.

## 2020-10-30 NOTE — OP NOTE
size 9 four-in-one cutting block. Anterior and posterior cuts and  chamfer cuts were then made. Surface of the tibia was then osteotomized  with the tibial jig. Remnants of menisci were removed. The patient had  marked wear, particularly involving the posterior lateral corner. Undersurface of the patella was then osteotomized and it was elected to  go with a 35-mm patellar dome. Baldwin holes were placed. The knee was  then cleansed with Simpulse with gentamicin in the irrigation solution  and trial reduction was then carried out. The lateral side of the joint  was still noted to be quite tight and it was elected to use a  pie-crusting technique with an 18-gauge needle to release the lateral  collateral ligament and the IT band. Following this, good alignment was  noted in the AP direction. There was still some mis-balance with  flexion, being particularly tight posterolaterally and a little loose  posteromedially. It is elected to switch from a 9 to an 8 prosthesis,  internally rotating the component to more laxity posterolaterally. This  was performed and following this, the knee was able to be fully extended  and flexed past 120 degrees. It appeared to be well balanced both in  flexion and extension. The tourniquet was elevated to 350 mmHg. Knee  was cleansed with Simpulse and gentamicin in the irrigation solution. The tibial component was then cemented in place. This was followed by  cementing the femoral component in place and finally patellar dome was  cemented in place. All excess cement was removed. A size 16 MC vitamin  E poly was inserted and the knee was noted to be well balanced and  stable. The deep fascial structures were injected with the total joint  compound. The wound was irrigated out with the TXA solution using 1 gm  of TXA for irrigation and then the deep fascial layer was closed with #1  Vicryl figure-of-eight sutures over two Hemovac drains.   The  subcutaneous was closed with 2-0 Vicryl and skin margins were injected  with remainder of the total joint compound and closed with a 3-0  Monocryl subcuticular stitch followed by Steri-Strips in the skin. Following completion of this, the area of the foreign body was exposed  on the pretibial area about one-third of the way down the tibia from the  knee. Longitudinal skin incision was made directly over this area. 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 The whole mass was removed en  bloc and sent to pathology. The wound was irrigated out. Hemostasis  was obtained with bipolar. The subcutaneous was closed with 2-0 Vicryl  and the skin was reapproximated with 4-0 undyed Monocryl subcuticular  stitch. Wound was dressed with Steri-Strips. Both wounds were then  dressed with fluffs, ABDs, Kerlix roll, and an ACE wrap from toe to  groin. Tourniquet had been released prior to closure of the knee wound  with good hemostasis being noted. Estimated blood loss was 600 mL. The  patient was transferred to Recovery in stable condition. KAHLIL Duffy    D: 10/29/2020 16:32:13       T: 10/29/2020 16:42:04     [email or web address]  Job#: [id number]     Doc#: [id number]

## 2020-10-30 NOTE — PROGRESS NOTES
Nutrition Assessment     Type and Reason for Visit: Initial    Nutrition Recommendations/Plan:   1. Continue current diet. 2. Encourage sources of protein with meals. Nutrition Assessment:  Increased nutrient needs r/t acute injury/trauma aeb healing needs from L TKA. Pt with low Na 131. Pt denied any weight or PO changes. Pt encouraged to consume protein foods. Malnutrition Assessment:  Malnutrition Status: No malnutrition    Nutrition Related Findings: appears well nourished      Current Nutrition Therapies:    DIET GENERAL; Anthropometric Measures:  · Height: 5' 10.5\" (179.1 cm)  · Current Body Wt: 249 lb 3.2 oz (113 kg)   · BMI: 35.2    Nutrition Diagnosis:   · Increased nutrient needs related to acute injury/trauma as evidenced by wounds      Nutrition Interventions:   Food and/or Nutrient Delivery:  Continue Current Diet  Nutrition Education/Counseling:  Education initiated(encouraged protein foods)   Coordination of Nutrition Care:  Continue to monitor while inpatient    Goals:  PO > 75% of meals with good sources of protein to aid healing needs     No results for input(s): NA, K, CL, CO2, BUN, CREATININE, GLUCOSE, ALT, ALB, ALKPHOS, GFR in the last 72 hours.     Invalid input(s): CA,  AST,  BILITOT, OSMO No results found for: LABALBU     Nutrition Monitoring and Evaluation:   Behavioral-Environmental Outcomes:  None Identified   Food/Nutrient Intake Outcomes:  Food and Nutrient Intake  Physical Signs/Symptoms Outcomes:  Biochemical Data, Skin, Weight     Discharge Planning:    Continue current diet     Electronically signed by Suresh Vigil RD, LD on 10/30/20 at 9:40 AM EDT    Contact: [phone]

## 2020-10-30 NOTE — PROGRESS NOTES
Progress Note    Subjective:     Post-Operative Day: 1 Status Post left Total Knee Arthroplasty  Systemic or Specific Complaints:No Complaints    Objective:     Patient Vitals for the past 24 hrs:   BP Temp Temp src Pulse Resp SpO2 Height Weight   10/30/20 1120 111/67 97.7 °F (36.5 °C) Temporal 66 18 97 % -- --   10/30/20 0937 -- -- -- -- -- -- 5' 10.5\" (1.791 m) --   10/30/20 0705 110/66 97.8 °F (36.6 °C) Temporal 60 16 95 % -- --   10/30/20 0630 108/68 98.1 °F (36.7 °C) Temporal 64 16 97 % -- --   10/30/20 0437 -- -- -- -- -- -- -- 249 lb 3.2 oz (113 kg)   10/30/20 0400 122/70 97.5 °F (36.4 °C) -- 57 16 94 % -- --   10/29/20 2335 111/75 97.1 °F (36.2 °C) Temporal 63 16 94 % -- --   10/29/20 2135 118/67 97.2 °F (36.2 °C) Temporal 68 16 94 % -- --   10/29/20 1935 107/60 97.1 °F (36.2 °C) Temporal 64 16 94 % -- --   10/29/20 1835 112/69 97.6 °F (36.4 °C) Temporal 60 16 92 % -- --   10/29/20 1757 120/72 -- -- 54 16 94 % -- --   10/29/20 1735 123/68 97.5 °F (36.4 °C) Temporal 56 16 93 % 5' 10.5\" (1.791 m) 257 lb 4.8 oz (116.7 kg)   10/29/20 1645 106/68 -- -- 52 14 94 % -- --   10/29/20 1630 105/64 -- -- 52 12 94 % -- --   10/29/20 1625 100/67 -- -- 56 12 94 % -- --   10/29/20 1620 (!) 106/57 -- -- 54 16 93 % -- --   10/29/20 1615 101/69 97.1 °F (36.2 °C) Temporal 52 14 93 % -- --   10/29/20 1215 -- -- -- -- -- 98 % -- --       General: alert, appears stated age and cooperative   Wound: Wound clean and dry no evidence of infection. Motion: Painless Range of Motion   DVT Exam: No evidence of DVT seen on physical exam.       Knee swollen but thigh soft to palpation. Moving foot and ankle. Good distal pulses. Data Review  CBC:   Lab Results   Component Value Date    WBC 6.4 09/18/2020    RBC 4.63 09/18/2020    HGB 11.0 10/30/2020    HCT 36.0 10/30/2020     09/18/2020       Assessment:     Status Post left Total Knee Arthroplasty. Doing well postoperatively.      Plan:      1: Discharge today, Return to Clinic: 2 weeks :  2:  Continue Deep venous thrombosis prophylaxis  3:  Continue physical therapy  4:  Continue Pain Control

## 2020-10-30 NOTE — PROGRESS NOTES
Occupational Therapy   Occupational Therapy Initial Assessment  Date: 10/30/2020   Patient Name: Dipak Monday  MRN: [id number]     : 1955    Date of Service: 10/30/2020    Discharge Recommendations:  Continue to assess pending progress       Assessment   Performance deficits / Impairments: Decreased functional mobility ; Decreased ADL status; Decreased endurance;Decreased strength  Assessment: Pt is a 59year old male admitted s/p L total knee arthroplasty. Pt presents with decreased ADl independence, as well as decreased endurance and strength for completion of ADL and functional tasks. Pt able to complete functional transfers and functional mobility with SBA, and requires increased assistance for LB dressing/ADL. Pt would benefit from skilled OT services to address deficits. Treatment Diagnosis: generalized weakness  Prognosis: Good  Decision Making: Low Complexity  OT Education: OT Role;Plan of Care;ADL Adaptive Strategies;Precautions  Patient Education: Pt educated on OT role and plan of care, as well as OT discharge folder. Provided education and demonstration to pt regarding easy slide to assist with donning jose hoes. Pt verbalized understanding. Pt also educated on AE/DME for use at home and those that may be beneficial to him, including sock aide and reacher. Pt verablized understanding. Educated pt on WBAT status, as well as placement of pillow when supporting knee to eliminate keeping knee in a flexed position when sitting or laying. When discussing jose hoes, educated pt on importance of making sure they are smooth without wrinkles to decrease risk of blood clot. Pt verablized understanding to all information provided.   Barriers to Learning: none  REQUIRES OT FOLLOW UP: Yes  Activity Tolerance  Activity Tolerance: Patient Tolerated treatment well  Safety Devices  Safety Devices in place: Yes  Type of devices: Left in chair;Call light within reach           Patient Diagnosis(es): There were no encounter diagnoses. has a past medical history of Arthritis, CAD (coronary artery disease), CHF (congestive heart failure) ([facility] [address] Hyperlipidemia, Hypertension, and MI (myocardial infarction) ([facility] [address] has a past surgical history that includes hernia repair; joint replacement (Right, 08/2019); Colonoscopy; and Total knee arthroplasty (Left, 10/29/2020). Treatment Diagnosis: generalized weakness      Restrictions  Restrictions/Precautions  Restrictions/Precautions: Weight Bearing, General Precautions, Fall Risk  Lower Extremity Weight Bearing Restrictions  Left Lower Extremity Weight Bearing: Weight Bearing As Tolerated    Subjective   General  Chart Reviewed: Yes  Patient assessed for rehabilitation services?: Yes  Family / Caregiver Present: Yes  Referring Practitioner: Dr. Sandra Pastor  Diagnosis: L Total Knee Arthroplasty  Subjective  Subjective: Pt reports 0/10 pain at this time. General Comment  Comments: Pt sittin farzanaprlevi in bedside chair on therapist arrival, agreeable to OT evaluation. Pain Assessment  Pain Assessment: 0-10  Pain Level: 0  Social/Functional History  Social/Functional History  Lives With: Other (comment)  Type of Home: Trailer  Home Layout: One level  Home Access: Stairs to enter with rails  Bathroom Shower/Tub: Tub/Shower unit  Bathroom Toilet: Handicap height  Bathroom Equipment: Grab bars in shower  Home Equipment: Cane, Rolling walker  ADL Assistance: Independent  Ambulation Assistance: Independent  Transfer Assistance: Independent  Additional Comments: patient independent with ADLs and ambulation prior to surgery       Objective   Vision: Impaired  Vision Exceptions: Wears glasses at all times  Hearing: Within functional limits          Balance  Sitting Balance: Modified independent   Standing Balance: Stand by assistance  Standing Balance  Time: ~2 minutes  Activity: ambulation  Comment: no LOB or dizziness. good tolerance.   Functional Mobility  Functional - Mobility Device: Rolling greater than 5 minutes with no greater LOB to improve endurance and safety with ADL and functional mobility.        Therapy Time   Individual Concurrent Group Co-treatment   Time In [facility]         Time Out 0901         Minutes Cira Aponte [address], [place]

## 2020-10-31 ENCOUNTER — CARE COORDINATION (OUTPATIENT)
Dept: CARE COORDINATION | Age: 65
End: 2020-10-31

## 2020-10-31 NOTE — CARE COORDINATION
Attempted to reach patient for hospital follow up and Covid screening. Message left on voicemail with call back number. Will route to CTN for follow up.

## 2020-11-02 ENCOUNTER — HOSPITAL ENCOUNTER (OUTPATIENT)
Dept: PHYSICAL THERAPY | Age: 65
Setting detail: THERAPIES SERIES
Discharge: HOME | End: 2020-11-02
Payer: COMMERCIAL

## 2020-11-02 LAB — SURGICAL PATHOLOGY REPORT: NORMAL

## 2020-11-02 PROCEDURE — G0283 ELEC STIM OTHER THAN WOUND: HCPCS

## 2020-11-02 PROCEDURE — 97161 PT EVAL LOW COMPLEX 20 MIN: CPT

## 2020-11-02 PROCEDURE — 97110 THERAPEUTIC EXERCISES: CPT

## 2020-11-02 ASSESSMENT — PAIN DESCRIPTION - ORIENTATION: ORIENTATION: LEFT

## 2020-11-02 ASSESSMENT — PAIN DESCRIPTION - LOCATION: LOCATION: KNEE

## 2020-11-02 NOTE — PROGRESS NOTES
Phone: [phone] [facility]          Fax: [phone]                      Outpatient Physical Therapy                                                                      Evaluation  Date: 2020  Patient: Donya Briones  : 1955  CSN #: [id number]  Referring Practitioner: Dr. 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 Pt states he has been getting around well at home and has been going up and down his steps with mod difficulty. Pt states he is having some trouble sleeping d/t pain and discomfort. Pt arrives without compression stockings but states that he has tried to wear them here and there. Pt states he has been trying to ice and elevate his leg throughout the day. Pt states he has been using RW for ambulation, but was not using AD prior to surgery and is a goal to walk without AD. Additional Pertinent Hx: R TKA 2019  Pain Screening  Patient Currently in Pain: Yes  Pain Assessment  Pain Assessment: 0-10  Patient's Stated Pain Goal: 5  Pain Location: Knee  Pain Orientation: Left          Objective     Observation/Palpation  Observation: Pt arrives ambulating with RW and significant antalgia, with step-to gait and decreased fanny. Pt requires assistance for LLE with bed mob d/t weakness and pain. Surgical dressing donned over incision. No compression stockings at time of arrival this date, but therapist assisted with donning stockings at end of eval.  Edema: Significant edema L knee and mod in lower leg when compared bilaterally.     LLE General AROM: Knee 20-79*         Strength LLE  Comment: Hip flex 3/5, Quad ~3+/5, HS 3+/5       Functional Outcome Measures     Any of your usual work, housework, or school activities: Quite a Bit of Difficulty  Your usual hobbies, recreational, or sporting activities: Extreme Difficulty or Unable to Perform Activity  Getting into or out of the bath: Quite a Bit of Difficulty  Walking between rooms: Quite a Bit of Difficulty  Putting on your shoes or socks: Extreme Difficulty or Unable to Perform Activity  Squatting: Extreme Difficulty or Unable to Perform Activity  Lifting an object, like a bag of groceries from the floor: Quite a Bit of Difficulty  Performing light activities around your home: Quite a Bit of Difficulty  Performing heavy activities around your home: Extreme Difficulty or Unable to Perform Activity  Getting into or out of a car: Extreme Difficulty or Unable to Perform Activity  Walking 2 blocks: Quite a Bit of Difficulty  Walking a mile: Extreme Difficulty or Unable to Perform Activity  Going up or down 10 stairs (about 1 flight of stairs): Quite a Bit of Difficulty  Standing for 1 hour: Extreme Difficulty or Unable to Perform Activity  Sitting for 1 hour: Quite a Bit of Difficulty  Rolling over in bed: Quite a Bit of Difficulty  LEFS Total Score: 9       Assessment  Assessment: Pt is a 58 y/o male who presents to PT s/p L TKA (DOS 10/29/2020). Pt arrives ambulating with RW and significant antalgia, with step-to gait and decreased fanny. Pt requires assistance for LLE with bed mob d/t weakness and pain. Significant edema L knee and mod in lower leg when compared bilaterally. No compression stockings donned upon arrival to [facility], but therapist assisted with donning stockings at end of eval and educated on importance of compression stockings for decreasing risk of blood clots. L knee ROM measured 20-79*. LLE MMT: Hip flex 3/5, Quad ~3+/5, HS 3+/5.  Pt will benefit from skilled PT services to address the above

## 2020-11-02 NOTE — PLAN OF CARE
[facility]           Phone: [phone]             Outpatient Physical Therapy  Fax: [phone]                                           Date: 2020  Patient: Rebecca Zavala : 1955 CSN #: [id number]   Referring Practitioner:  Dr. Joyce Rinaldi Referral Date:  20       [x] Plan of Care   [] Updated Plan of Care    Dates of Service to Include: 2020 to 20    Diagnosis:  Arthritis of left knee (M17.12), Presence of total left knee joint prosthesis (E08.962)    Rehab (Treatment) Diagnosis:  L knee pain, difficulty walking             Onset Date:  10/29/20    Attendance  Total # of Visits to Date: 1 No Show: 0 Canceled Appointment: 0    Assessment  Assessment: Pt is a 58 y/o male who presents to PT s/p L TKA (DOS 10/29/2020). Pt arrives ambulating with RW and significant antalgia, with step-to gait and decreased fanny. Pt requires assistance for LLE with bed mob d/t weakness and pain. Significant edema L knee and mod in lower leg when compared bilaterally. No compression stockings donned upon arrival to eval, but therapist assisted with donning stockings at end of eval and educated on importance of compression stockings for decreasing risk of blood clots. L knee ROM measured 20-79*. LLE MMT: Hip flex 3/5, Quad ~3+/5, HS 3+/5. Pt will benefit from skilled PT services to address the above impairments and to work toward the established functional goals. Goals  Short term goals  Time Frame for Short term goals: 3 weeks  Short term goal 1: Pt will initiate HEP. Short term goal 2: Pt will improve L knee flexion ROM to >/= 90* to progress functional mobility. Short term goal 3: Pt will improve L knee ext to be lacking </= 10* from 0 to improve gait quality. Long term goals  Time Frame for Long term goals : 6 weeks  Long term goal 1: Pt will be independent and compliant with HEP.   Long

## 2020-11-04 ENCOUNTER — HOSPITAL ENCOUNTER (OUTPATIENT)
Dept: PHYSICAL THERAPY | Age: 65
Setting detail: THERAPIES SERIES
Discharge: HOME | End: 2020-11-04
Payer: COMMERCIAL

## 2020-11-04 PROCEDURE — 97140 MANUAL THERAPY 1/> REGIONS: CPT

## 2020-11-04 PROCEDURE — 97110 THERAPEUTIC EXERCISES: CPT

## 2020-11-04 PROCEDURE — G0283 ELEC STIM OTHER THAN WOUND: HCPCS

## 2020-11-04 NOTE — PROGRESS NOTES
Phone: [facility]           Fax: [phone]                           Outpatient Physical Therapy                                                                            Daily Note    Patient: Oscar Gardiner : 1955  CSN #: [id number]   Referring Practitioner:  Dr. Aleta Burnett    Referral Date : 20     Date: 2020    Diagnosis: Arthritis of left knee (M17.12), Presence of total left knee joint prosthesis (N58.144)  Treatment Diagnosis: L knee pain, difficulty walking    Onset Date: 10/29/20  PT Insurance Information: [employer]  Total # of Visits Approved: 18 Per Physician Order  Total # of Visits to Date: 2  No Show: 0  Canceled Appointment: 0      Pre-Treatment Pain:  5/10  Subjective: Pt reports he had alot of pain in front side of L knee last night while sleeping and he isnt sure why. Pt rates current pain a 5/10. Exercises:  Exercise 1: HEP: heel prop, ankle pumps, quad sets, heel slides  Exercise 2: SciFit bike 6 mins  Exercise 3: Step stretch 3x30 ea  Exercise 4: Standing toe raise, march toe taps on step 15x ea  Exercise 5: Heel slide 10x (slide board). Manual:  Joint mobilization: Pat mobs  PROM: Gentle ext    Modalities:  Cryotherapy (Minutes\Location): 15' post ther-ex for edmea/ pain  E-stim (parameters): 15 mins IFC post ther-ex for pain. Assessment  Assessment: Added scifit bike and standing step stretch to increase pt ROM. Pt struggles with L leg elevation to enter/ exit bed and throughout exercises. Will cont to advance towards STG as tolerable. Activity Tolerance  Activity Tolerance: Patient Tolerated treatment well    Patient Education  Patient Education: Cont HEP, Continue stretching. Pt verbalized/demonstrated good understanding:     [x] Yes         [] No, pt required further clarification.        Post Treatment Pain:  5/10      Plan  Times per week: 3  Plan weeks: 6      Goals  (Total # of Visits to Date: 2)      Short term goals  Time Frame for Short term goals: 3 weeks  Short term goal 1: Pt will initiate HEP. -MET  Short term goal 2: Pt will improve L knee flexion ROM to >/= 90* to progress functional mobility. Short term goal 3: Pt will improve L knee ext to be lacking </= 10* from 0 to improve gait quality. Long term goals  Time Frame for Long term goals : 6 weeks  Long term goal 1: Pt will be independent and compliant with HEP. Long term goal 2: Pt will improve L knee ROM to 0-110* to improve gait and functional mobility. Long term goal 3: Pt will improve L hip and knee strength to >/= 4 to 4+/5 for ease of stair navigation and transfers. Long term goal 4: Pt will ambulate household and community distances without AD and with min/no antalgia to improve functional ambulation.     Minutes Tracking:  Time In: 6447  Time Out: 7677  Minutes: 57  Timed Code [facility] Minutes: [facility] [place]       Date: 11/4/2020

## 2020-11-10 ENCOUNTER — HOSPITAL ENCOUNTER (OUTPATIENT)
Dept: PHYSICAL THERAPY | Age: 65
Setting detail: THERAPIES SERIES
Discharge: HOME | End: 2020-11-10
Payer: COMMERCIAL

## 2020-11-10 PROCEDURE — G0283 ELEC STIM OTHER THAN WOUND: HCPCS

## 2020-11-10 PROCEDURE — 97140 MANUAL THERAPY 1/> REGIONS: CPT

## 2020-11-10 PROCEDURE — 97110 THERAPEUTIC EXERCISES: CPT

## 2020-11-10 NOTE — PROGRESS NOTES
Phone: [facility]           Fax: [phone]                           Outpatient Physical Therapy                                                                            Daily Note    Patient: James Crow : 1955  CSN #: [id number]   Referring Practitioner:  Dr. Juan Jose Shrestha    Referral Date : 20     Date: 11/10/2020    Diagnosis: Arthritis of left knee (M17.12), Presence of total left knee joint prosthesis (A80.489)  Treatment Diagnosis: L knee pain, difficulty walking    Onset Date: 10/29/20  PT Insurance Information: [facility] [employer]   Total # of Visits Approved: 18 Per Physician Order  Total # of Visits to Date: 3  No Show: 0  Canceled Appointment: 0      Pre-Treatment Pain:  5/10  Subjective: Pt states he is doing a little better but he still isnt having much ability to lift leg. Pt rates current pain a 5/10. Exercises:  Exercise 1: HEP: heel prop, ankle pumps, quad sets, heel slides  Exercise 2: SciFit bike 6 mins  Exercise 3: Step stretch 3x30 ea  Exercise 4: Standing toe raise, march toe taps on step 15x ea  Exercise 5: Heel slide 10x (slide board). with strap  Exercise 6: FSu 4 inch  Exercise 7: TKE GTB 15x    Manual:  PROM: Gentle ext  Flex at EOB    Modalities:  Cryotherapy (Minutes\Location): 15' post ther-ex for edmea/ pain  E-stim (parameters): 15 mins IFC post ther-ex for pain. Assessment  Assessment: Flex AAROM with supine flex strap stretch 81*. Pt with increaswed awareness to knee drive during amb to prevent circumduction. Activity Tolerance  Activity Tolerance: Patient Tolerated treatment well    Patient Education  Patient Education: Gait cycle  Pt verbalized/demonstrated good understanding:     [x] Yes         [] No, pt required further clarification.        Post Treatment Pain:  5/10      Plan  Times per week: 3  Plan weeks: 6      Goals  (Total # of Visits to Date: 3)      Short term goals  Time Frame for Short term goals: 3 weeks  Short term goal 1: Pt will initiate HEP. -MET  Short term goal 2: Pt will improve L knee flexion ROM to >/= 90* to progress functional mobility. -Progressing  Short term goal 3: Pt will improve L knee ext to be lacking </= 10* from 0 to improve gait quality. Long term goals  Time Frame for Long term goals : 6 weeks  Long term goal 1: Pt will be independent and compliant with HEP. Long term goal 2: Pt will improve L knee ROM to 0-110* to improve gait and functional mobility. Long term goal 3: Pt will improve L hip and knee strength to >/= 4 to 4+/5 for ease of stair navigation and transfers. Long term goal 4: Pt will ambulate household and community distances without AD and with min/no antalgia to improve functional ambulation.     Minutes Tracking:  Time In: 1140  Time Out: 4681  Minutes: 65  Timed Code Treatment Minutes: [address] [place] [address]       Date: 11/10/2020

## 2020-11-11 ENCOUNTER — HOSPITAL ENCOUNTER (OUTPATIENT)
Dept: PHYSICAL THERAPY | Age: 65
Setting detail: THERAPIES SERIES
Discharge: HOME | End: 2020-11-11
Payer: COMMERCIAL

## 2020-11-11 PROCEDURE — 97110 THERAPEUTIC EXERCISES: CPT

## 2020-11-11 PROCEDURE — G0283 ELEC STIM OTHER THAN WOUND: HCPCS

## 2020-11-11 PROCEDURE — 97140 MANUAL THERAPY 1/> REGIONS: CPT

## 2020-11-11 NOTE — PROGRESS NOTES
Phone: [facility]           Fax: [phone]                           Outpatient Physical Therapy                                                                            Daily Note    Patient: Enoc Dumont : 1955  CSN #: [id number]   Referring Practitioner:  Dr. 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 Pt rates current pain a 4-5/10. Exercises:  Exercise 1: HEP: heel prop, ankle pumps, quad sets, heel slides  Exercise 2: SciFit bike 6 mins  Exercise 3: Step stretch 3x30 ea  Exercise 5: Heel slide 10x (slide board). with strap  Exercise 6: FSu 4 inch  Exercise 7: TKE GTB 15x  Exercise 8: hamstring curl GTB 10x    Manual:  PROM: Gentle ext  Flex at EOB    Modalities:  Cryotherapy (Minutes\Location): 15' post ther-ex for edmea/ pain  E-stim (parameters): 15 mins IFC post ther-ex for pain. Assessment  Assessment: Pt lacking 4 degrees from full ext. Began SC usage around clinic with lack of safety noted. Will cont. Activity Tolerance  Activity Tolerance: Patient Tolerated treatment well    Patient Education  Patient Education: Gait cycle  Pt verbalized/demonstrated good understanding:     [x] Yes         [] No, pt required further clarification.      Post Treatment Pain:  4/10    Plan  Times per week: 3  Plan weeks: 6    Goals  (Total # of Visits to Date: 4)      Short term goals  Time Frame for Short term goals: 3 weeks  Short term goal 1: Pt will initiate HEP. -MET  Short

## 2020-11-13 ENCOUNTER — HOSPITAL ENCOUNTER (OUTPATIENT)
Dept: PHYSICAL THERAPY | Age: 65
Setting detail: THERAPIES SERIES
Discharge: HOME | End: 2020-11-13
Payer: COMMERCIAL

## 2020-11-13 PROCEDURE — G0283 ELEC STIM OTHER THAN WOUND: HCPCS

## 2020-11-13 PROCEDURE — 97110 THERAPEUTIC EXERCISES: CPT

## 2020-11-13 NOTE — PROGRESS NOTES
Phone: [facility]           Fax: [phone]                           Outpatient Physical Therapy                                                                            Daily Note    Patient: Sanjuana Zepeda : 1955  CSN #: [id number]   Referring Practitioner:  Dr. 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 Pt rates current pain a 4/10. Exercises:  Exercise 1: HEP: heel prop, ankle pumps, quad sets, heel slides  Exercise 2: SciFit bike 6 mins  Exercise 3: Step stretch 3x30 ea  Exercise 5: Heel slide 10x (slide board). with strap, heel slide, SAQ, TKE, hip abd 15xea  Exercise 7: TKE GTB 15x  Exercise 8: hamstring curl GTB 10x  Exercise 9: LAq 0#    Manual:  PROM: Gentle ext  Flex at EOB    Modalities:  Cryotherapy (Minutes\Location): 15' post ther-ex for edmea/ pain  E-stim (parameters): 15 mins IFC post ther-ex for pain. Assessment  Assessment: L knee flex AAROM with strap stretch 91* today. Encouraged Pt to cont using leg/ hip more at home to cont with progressions. Activity Tolerance  Activity Tolerance: Patient Tolerated treatment well    Patient Education  Patient Education: Benefits of ex. Pt verbalized/demonstrated good understanding:     [x] Yes         [] No, pt required further clarification.      Post Treatment Pain:  3/10    Plan  Times per week: 3  Plan weeks: 6    Goals  (Total # of Visits to Date: 5)      Short term goals  Time Frame for Short term goals: 3 weeks  Short term goal 1: Pt will initiate HEP. -MET  Short term goal 2: Pt will improve L knee flexion ROM to >/= 90* to progress functional mobility. -MET  Short term goal 3: Pt will improve L knee ext to be lacking </= 10* from 0 to improve gait quality. -MET    Long term goals  Time Frame for Long term goals : 6 weeks  Long term goal 1: Pt will be independent and compliant with HEP. Long term goal 2: Pt will improve L knee ROM to 0-110* to improve gait and functional mobility. Long term goal 3: Pt will improve L hip and knee strength to >/= 4 to 4+/5 for ease of stair navigation and transfers. Long term goal 4: Pt will ambulate household and community distances without AD and with min/no antalgia to improve functional ambulation.     Minutes Tracking:  Time In: 9652  Time Out: 1030  Minutes: 67  Timed Code Treatment Minutes: [address] [place], [place]       Date: 11/13/2020

## 2020-11-17 ENCOUNTER — HOSPITAL ENCOUNTER (OUTPATIENT)
Dept: PHYSICAL THERAPY | Age: 65
Setting detail: THERAPIES SERIES
Discharge: HOME | End: 2020-11-17
Payer: COMMERCIAL

## 2020-11-17 PROCEDURE — 97110 THERAPEUTIC EXERCISES: CPT

## 2020-11-17 PROCEDURE — G0283 ELEC STIM OTHER THAN WOUND: HCPCS

## 2020-11-17 PROCEDURE — 97140 MANUAL THERAPY 1/> REGIONS: CPT

## 2020-11-17 NOTE — PROGRESS NOTES
Phone: [facility]           Fax: [phone]                           Outpatient Physical Therapy                                                                            Daily Note    Patient: Rosa Francisco : 1955  CSN #: [id number]   Referring Practitioner:  Dr. 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 Pt rates current pain a 3-410. Exercises:  Exercise 1: HEP: heel prop, ankle pumps, quad sets, heel slides  Exercise 2: SciFit bike 6 mins  Exercise 3: Step stretch 3x30 ea  Exercise 6: FSu 4 inch  Exercise 7: TKE GTB 15x  Exercise 9: LAq 0#  Exercise 10: Sit<>stand 2x10    Manual:  PROM: Gentle ext  Flex at EOB    Modalities:  Cryotherapy (Minutes\Location): 15' post ther-ex for edmea/ pain  E-stim (parameters): 15 mins IFC post ther-ex for pain. Assessment  Assessment: Pt now amb with SC for community and home distance. Pt L knee PROM 93*. Ext displays greater limitations. Activity Tolerance  Activity Tolerance: Patient Tolerated treatment well    Patient Education  Patient Education: Cont HEP. Pt verbalized/demonstrated good understanding:     [x] Yes         [] No, pt required further clarification.        Post Treatment Pain:  3/10      Plan  Times per week: 3  Plan weeks: 6      Goals  (Total # of Visits to Date: 6)      Short term goals  Time Frame for Short term goals: 3 weeks  Short term goal 1: Pt will initiate HEP. -MET  Short term goal 2: Pt will improve L knee flexion ROM to >/= 90* to progress functional mobility. -MET  Short term goal 3: Pt will improve L knee ext to be lacking </= 10* from 0 to improve gait quality. -MET    Long term goals  Time Frame for Long term goals : 6 weeks  Long term goal 1: Pt will be independent and compliant with HEP. Long term goal 2: Pt will improve L knee ROM to 0-110* to improve gait and functional mobility. Long term goal 3: Pt will improve L hip and knee strength to >/= 4 to 4+/5 for ease of stair navigation and transfers. Long term goal 4: Pt will ambulate household and community distances without AD and with min/no antalgia to improve functional ambulation.     Minutes Tracking:  Time In: 1016  Time Out: 1115  Minutes: 59  Timed Code Treatment Minutes: [address], [place]       Date: 11/17/2020

## 2020-11-18 ENCOUNTER — HOSPITAL ENCOUNTER (OUTPATIENT)
Dept: PHYSICAL THERAPY | Age: 65
Setting detail: THERAPIES SERIES
Discharge: HOME | End: 2020-11-18
Payer: COMMERCIAL

## 2020-11-18 PROCEDURE — G0283 ELEC STIM OTHER THAN WOUND: HCPCS

## 2020-11-18 PROCEDURE — 97140 MANUAL THERAPY 1/> REGIONS: CPT

## 2020-11-18 PROCEDURE — 97110 THERAPEUTIC EXERCISES: CPT

## 2020-11-18 NOTE — PROGRESS NOTES
Phone: [facility]           Fax: [phone]                           Outpatient Physical Therapy                                                                            Daily Note    Patient: Donya Briones : 1955  CSN #: [id number]   Referring Practitioner:  Dr. 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 Pt states he was pretty sore yesterday afternoon on inside and outside of knee. Exercises:  Exercise 1: HEP: heel prop, ankle pumps, quad sets, heel slides  Exercise 2: SciFit bike 8 mins  Exercise 3: Step stretch 3x30 ea  Exercise 5: Heel slide 10x (slide board). with strap, heel slide, SAQ, TKE, hip abd 15xea  Exercise 6: FSu 6 inch  Exercise 7: TKE GTB 15x  Exercise 10: Sit<>stand 2x10    Manual:  PROM: Gentle ext  Flex at EOB    Modalities:  Cryotherapy (Minutes\Location): 15' post ther-ex for edmea/ pain  E-stim (parameters): 15 mins IFC post ther-ex for pain. Assessment  Assessment: L knee ext lacking 5 from neutral.  Continued working on increasing functional strength. Activity Tolerance  Activity Tolerance: Patient Tolerated treatment well    Patient Education  Patient Education: Cont HEP. Pt verbalized/demonstrated good understanding:     [x] Yes         [] No, pt required further clarification.      Post Treatment Pain:  4/10    Plan  Times per week: 3  Plan weeks: 6    Goals  (Total # of Visits to Date: 7)      Short term goals  Time Frame for Short term goals: 3 weeks  Short term goal 1: Pt will initiate HEP. -MET  Short term goal 2: Pt will improve L knee flexion ROM to >/= 90* to progress functional mobility. -MET  Short term goal 3: Pt will improve L knee ext to be lacking </= 10* from 0 to improve gait quality. -MET    Long term goals  Time Frame for Long term goals : 6 weeks  Long term goal 1: Pt will be independent and compliant with HEP. Long term goal 2: Pt will improve L knee ROM to 0-110* to improve gait and functional mobility. Long term goal 3: Pt will improve L hip and knee strength to >/= 4 to 4+/5 for ease of stair navigation and transfers. Long term goal 4: Pt will ambulate household and community distances without AD and with min/no antalgia to improve functional ambulation.     Minutes Tracking:  Time In: 7373  Time Out: 2392  Minutes: 61  Timed Code Treatment Minutes: [address] [place], [place]       Date: 11/18/2020

## 2020-11-20 ENCOUNTER — HOSPITAL ENCOUNTER (OUTPATIENT)
Dept: PHYSICAL THERAPY | Age: 65
Setting detail: THERAPIES SERIES
Discharge: HOME | End: 2020-11-20
Payer: COMMERCIAL

## 2020-11-20 PROCEDURE — 97110 THERAPEUTIC EXERCISES: CPT

## 2020-11-20 PROCEDURE — 97140 MANUAL THERAPY 1/> REGIONS: CPT

## 2020-11-20 PROCEDURE — G0283 ELEC STIM OTHER THAN WOUND: HCPCS

## 2020-11-20 NOTE — PROGRESS NOTES
term goals: 3 weeks  Short term goal 1: Pt will initiate HEP. -MET  Short term goal 2: Pt will improve L knee flexion ROM to >/= 90* to progress functional mobility. -MET  Short term goal 3: Pt will improve L knee ext to be lacking </= 10* from 0 to improve gait quality. -MET    Long term goals  Time Frame for Long term goals : 6 weeks  Long term goal 1: Pt will be independent and compliant with HEP. Long term goal 2: Pt will improve L knee ROM to 0-110* to improve gait and functional mobility. Long term goal 3: Pt will improve L hip and knee strength to >/= 4 to 4+/5 for ease of stair navigation and transfers. Long term goal 4: Pt will ambulate household and community distances without AD and with min/no antalgia to improve functional ambulation.     Minutes Tracking:  Time In: 0844  Time Out: 8947  Minutes: 58  Timed Code Treatment Minutes: [facility] [address] [address]       Date: 11/20/2020

## 2020-11-23 ENCOUNTER — HOSPITAL ENCOUNTER (OUTPATIENT)
Dept: PHYSICAL THERAPY | Age: 65
Setting detail: THERAPIES SERIES
Discharge: HOME | End: 2020-11-23
Payer: COMMERCIAL

## 2020-11-23 PROCEDURE — 97110 THERAPEUTIC EXERCISES: CPT

## 2020-11-23 PROCEDURE — 97140 MANUAL THERAPY 1/> REGIONS: CPT

## 2020-11-23 PROCEDURE — G0283 ELEC STIM OTHER THAN WOUND: HCPCS

## 2020-11-23 NOTE — PROGRESS NOTES
Phone: [facility]           Fax: [phone]                           Outpatient Physical Therapy                                                                            Daily Note    Patient: Sanjuana Zepeda : 1955  CSN #: [id number]   Referring Practitioner:  Dr. 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 He states hes really been working his knee. Pt rates current pain a 5/10. Exercises:  Exercise 1: HEP: heel prop, ankle pumps, quad sets, heel slides  Exercise 2: SciFit bike 8 mins  Exercise 3: Step stretch 3x30 ea  Exercise 6: FSu/ LSU 6 inch SD 4 inch  Exercise 7: TKE GTB 15x  Exercise 8: hamstring curl GTB 10x  Exercise 10: Sit<>stand 2x10    Manual:  PROM: Gentle ext  Flex at EOB/ Prone    Modalities:  Cryotherapy (Minutes\Location): 15' post ther-ex for edmea/ pain  E-stim (parameters): 15 mins IFC post ther-ex for pain. Assessment  Assessment: Supine flex PROM 93* today. Pt continues to amb with stiff knee gait pattern. Activity Tolerance  Activity Tolerance: Patient Tolerated treatment well    Patient Education  Patient Education: Importance of stretching. Pt verbalized/demonstrated good understanding:     [x] Yes         [] No, pt required further clarification.      Post Treatment Pain:  5/10    Plan  Times per week: 3  Plan weeks: 6    Goals  (Total # of Visits to Date: 5)      Short term goals  Time Frame for Short term goals: 3 weeks  Short term goal 1: Pt will initiate HEP. -MET  Short term goal 2: Pt will improve L knee flexion ROM to >/= 90* to progress functional mobility. -MET  Short term goal 3: Pt will improve L knee ext to be lacking </= 10* from 0 to improve gait quality. -MET    Long term goals  Time Frame for Long term goals : 6 weeks  Long term goal 1: Pt will be independent and compliant with HEP. Long term goal 2: Pt will improve L knee ROM to 0-110* to improve gait and functional mobility. Long term goal 3: Pt will improve L hip and knee strength to >/= 4 to 4+/5 for ease of stair navigation and transfers. Long term goal 4: Pt will ambulate household and community distances without AD and with min/no antalgia to improve functional ambulation.     Minutes Tracking:  Time In: 4793  Time Out: 7869  Minutes: 62  Timed Code Treatment Minutes: [address] [facility], [place]       Date: 11/23/2020

## 2020-11-25 ENCOUNTER — HOSPITAL ENCOUNTER (OUTPATIENT)
Dept: PHYSICAL THERAPY | Age: 65
Setting detail: THERAPIES SERIES
Discharge: HOME | End: 2020-11-25
Payer: COMMERCIAL

## 2020-11-25 PROCEDURE — G0283 ELEC STIM OTHER THAN WOUND: HCPCS

## 2020-11-25 PROCEDURE — 97140 MANUAL THERAPY 1/> REGIONS: CPT

## 2020-11-25 PROCEDURE — 97110 THERAPEUTIC EXERCISES: CPT

## 2020-11-25 NOTE — PROGRESS NOTES
Phone: [facility]           Fax: [phone]                           Outpatient Physical Therapy                                                                            Daily Note    Patient: Traci Upton : 1955  CSN #: [id number]   Referring Practitioner:  Dr. 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 Pt rates current pain a 4/10. Exercises:  Exercise 1: HEP: heel prop, ankle pumps, quad sets, heel slides  Exercise 2: SciFit bike 8 mins  Exercise 6: FSu/ LSU 6 inch SD 4 inch  Exercise 7: TKE GTB 15x  Exercise 9: LAq 0#  Exercise 10: Sit<>stand 2x10  Exercise 12: StarTrac flex 15x2 35#    Manual:  PROM: EOB and supine flex with contract relax. Modalities:  E-stim (parameters): 15 mins IFC post ther-ex for pain. Assessment  Assessment: Safety noted with SC. Reviewed flexion stretches and importance of that to increase ROM. Pt reports doing step ups and squats at home which he feels is a good stretch. Reviewed need for seated/ supine stretch to increase ROM. Activity Tolerance  Activity Tolerance: Patient Tolerated treatment well    Patient Education  Patient Education: Importance of stretching. Pt verbalized/demonstrated good understanding:     [x] Yes         [] No, pt required further clarification. Post Treatment Pain:  3/10    Plan  Times per week: 3  Plan weeks: 6    Goals  (Total # of Visits to Date: 10)      Short term goals  Time Frame for Short term goals: 3 weeks  Short term goal 1: Pt will initiate HEP. -MET  Short term goal 2: Pt will improve L knee flexion ROM to >/= 90* to progress functional mobility. -MET  Short term goal 3: Pt will improve L knee ext to be lacking </= 10* from 0 to improve gait quality. -MET    Long term goals  Time Frame for Long term goals : 6 weeks  Long term goal 1: Pt will be independent and compliant with HEP. Long term goal 2: Pt will improve L knee ROM to 0-110* to improve gait and functional mobility. Long term goal 3: Pt will improve L hip and knee strength to >/= 4 to 4+/5 for ease of stair navigation and transfers. Long term goal 4: Pt will ambulate household and community distances without AD and with min/no antalgia to improve functional ambulation.     Minutes Tracking:  Time In: 1302  Time Out: 1400  Minutes: 58  Timed Code Treatment Minutes: [place] , [place]       Date: 11/25/2020

## 2020-11-27 ENCOUNTER — HOSPITAL ENCOUNTER (OUTPATIENT)
Dept: PHYSICAL THERAPY | Age: 65
Setting detail: THERAPIES SERIES
Discharge: HOME | End: 2020-11-27
Payer: COMMERCIAL

## 2020-11-27 PROCEDURE — 97110 THERAPEUTIC EXERCISES: CPT

## 2020-11-27 PROCEDURE — 97140 MANUAL THERAPY 1/> REGIONS: CPT

## 2020-11-27 PROCEDURE — G0283 ELEC STIM OTHER THAN WOUND: HCPCS

## 2020-11-30 ENCOUNTER — APPOINTMENT (OUTPATIENT)
Dept: PHYSICAL THERAPY | Age: 65
End: 2020-11-30
Payer: COMMERCIAL

## 2020-11-30 NOTE — PROGRESS NOTES
Phone: [facility]           Fax: [phone]                           Outpatient Physical Therapy                                                                            Daily Note    Patient: Maylin Zamora : 1955  CSN #: [id number]   Referring Practitioner:  Dr. 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 Pt rates current pain a 4/10. Exercises:  Exercise 1: HEP: heel prop, ankle pumps, quad sets, heel slides  Exercise 2: SciFit bike 8 mins 3.5  Exercise 6: FSu/ LSU 6 inch SD 4 inch  Exercise 7: TKE GTB 15x  Exercise 9: LAq 0#  Exercise 10: Sit<>stand 2x10  Exercise 11: prone strap stretch 3x30  Exercise 12: StarTrac flex 15x2 35#    Manual:  PROM: EOB and supine flex with contract relax. and prone flex stretch    Modalities:  Cryotherapy (Minutes\Location): 15' post ther-ex for edmea/ pain  E-stim (parameters): 15 mins IFC post ther-ex for pain. Assessment  Assessment: Flex PROM with mod pverpressure 97*. Pt continues to guard and display empty end feel with PROM d/t pain. Activity Tolerance  Activity Tolerance: Patient Tolerated treatment well    Patient Education  Patient Education: Importance of stretching. Pt verbalized/demonstrated good understanding:     [x] Yes         [] No, pt required further clarification.      Post Treatment Pain:  3/10    Plan  Times per week: 3  Plan weeks: 6    Goals  (Total # of Visits to Date: 6)      Short term goals  Time Frame for Short term goals: 3 weeks  Short term goal 1: Pt will initiate HEP. -MET  Short term goal 2: Pt will improve L knee flexion ROM to >/= 90* to progress functional mobility. -MET  Short term goal 3: Pt will improve L knee ext to be lacking </= 10* from 0 to improve gait quality. -MET    Long term goals  Time Frame for Long term goals : 6 weeks  Long term goal 1: Pt will be independent and compliant with HEP. Long term goal 2: Pt will improve L knee ROM to 0-110* to improve gait and functional mobility. Long term goal 3: Pt will improve L hip and knee strength to >/= 4 to 4+/5 for ease of stair navigation and transfers. Long term goal 4: Pt will ambulate household and community distances without AD and with min/no antalgia to improve functional ambulation.     Minutes Tracking:  Time In: 2126  Time Out: 7213  Minutes: 63  Timed Code Treatment Minutes: 61 Minutes      Benton Steele [place]       Date: 11/27/2020

## 2020-12-01 ENCOUNTER — HOSPITAL ENCOUNTER (OUTPATIENT)
Dept: PHYSICAL THERAPY | Age: 65
Setting detail: THERAPIES SERIES
Discharge: HOME | End: 2020-12-01
Payer: COMMERCIAL

## 2020-12-01 PROCEDURE — 97110 THERAPEUTIC EXERCISES: CPT

## 2020-12-01 PROCEDURE — G0283 ELEC STIM OTHER THAN WOUND: HCPCS

## 2020-12-02 ENCOUNTER — HOSPITAL ENCOUNTER (OUTPATIENT)
Dept: PHYSICAL THERAPY | Age: 65
Setting detail: THERAPIES SERIES
Discharge: HOME | End: 2020-12-02
Payer: COMMERCIAL

## 2020-12-02 PROCEDURE — G0283 ELEC STIM OTHER THAN WOUND: HCPCS

## 2020-12-02 PROCEDURE — 97110 THERAPEUTIC EXERCISES: CPT

## 2020-12-02 NOTE — PROGRESS NOTES
Phone: [facility]           Fax: [phone]                           Outpatient Physical Therapy                                                                            Daily Note    Patient: Soraya Soria : 1955  CSN #: [id number]   Referring Practitioner:  Dr. 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 Will see  on Thursday this week. Exercises:  Exercise 2: SciFit bike 10 mins 3.5  Exercise 3: Step stretch 3x30 ea  Exercise 5: Heel slide 10x (slide board). with strap, heel slide, SAQ, TKE, hip abd 15xea  Exercise 6: FSu/ LSU 6 inch SD 4 inch  Exercise 10: Sit<>stand 2x10  Exercise 12: StarTrac flex 15x2 35#    Manual:  Joint mobilization: Pat mobs    Modalities:  Cryotherapy (Minutes\Location): 15' post ther-ex for edmea/ pain  E-stim (parameters): 15 mins IFC post ther-ex for pain. Assessment  Assessment: Pt gradually progressing. Discussed mimizing use of UE's during step ups with good understanding. Pt requires mod bilateral UE assistance to ascend 6 inch step with left LE. Will continue to progress as pt tolerates. Activity Tolerance  Activity Tolerance: Patient Tolerated treatment well    Patient Education  Patient Education: decreasing compensation with ex  Pt verbalized/demonstrated good understanding:     [x] Yes         [] No, pt required further clarification.        Post Treatment Pain:  3/10      Plan  Times per week: 3  Plan weeks: 6      Goals  (Total # of Visits to Date: 15)      Short term goals  Time Frame for Short term goals: 3 weeks  Short term goal 1: Pt will initiate HEP. -MET  Short term goal 2: Pt will improve L knee flexion ROM to >/= 90* to progress functional mobility. -MET  Short term goal 3: Pt will improve L knee ext to be lacking </= 10* from 0 to improve gait quality. -MET    Long term goals  Time Frame for Long term goals : 6 weeks  Long term goal 1: Pt will be independent and compliant with HEP. Long term goal 2: Pt will improve L knee ROM to 0-110* to improve gait and functional mobility. Long term goal 3: Pt will improve L hip and knee strength to >/= 4 to 4+/5 for ease of stair navigation and transfers. Long term goal 4: Pt will ambulate household and community distances without AD and with min/no antalgia to improve functional ambulation.     Minutes Tracking:  Time In: 1401  Time Out: 1507  Minutes: 66  Timed Code Treatment Minutes: Taryn Dao , PT, DPT, CMPT      Date: 12/1/2020

## 2020-12-02 NOTE — PROGRESS NOTES
Phone: Miladis           Fax: [phone]                           Outpatient Physical Therapy                                                                            Daily Note    Patient: Efraín Snider : 1955  [facility] #: [id number]   Referring Practitioner:  Dr. 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 Pt states his current pain a 5/10. Pt to RTD tomorrow. Exercises:  Exercise 1: HEP: heel prop, ankle pumps, quad sets, heel slides  Exercise 2: SciFit bike 10 mins 3.5  Exercise 6: FSu/ LSU 6 inch SD 4 inch  Exercise 10: Sit<>stand 2x10  Exercise 12: StarTrac flex 15x2 35# 3 pl ext 2x10    Manual:  PROM: EOB and supine flex with contract relax. and prone flex stretch    Modalities:  Cryotherapy (Minutes\Location): 15' post ther-ex for edmea/ pain  E-stim (parameters): 15 mins IFC post ther-ex for pain. Assessment  Assessment: ROM limited today d/t pain reported from Pt. Pt current ROM lacking 8 to 91*. Pt to RTD tomorrow, Dr note sent. Activity Tolerance  Activity Tolerance: Patient Tolerated treatment well    Patient Education  Patient Education: Importance of stretching. Pt verbalized/demonstrated good understanding:     [x] Yes         [] No, pt required further clarification.      Post Treatment Pain:  4/10    Plan  Times per week: 3  Plan weeks: 6    Goals  (Total # of Visits to Date: 15)      Short term goals  Short term goal 1: Pt will initiate HEP. -MET  Short term goal 2: Pt will improve L knee flexion ROM to >/= 90* to progress functional mobility. -MET  Short term goal 3: Pt will improve L knee ext to be lacking </= 10* from 0 to improve gait quality. -MET    Long term goals  Time Frame for Long term goals : 6 weeks  Long term goal 1: Pt will be independent and compliant with HEP. Long term goal 2: Pt will improve L knee ROM to 0-110* to improve gait and functional mobility. Long term goal 3: Pt will improve L hip and knee strength to >/= 4 to 4+/5 for ease of stair navigation and transfers. Long term goal 4: Pt will ambulate household and community distances without AD and with min/no antalgia to improve functional ambulation.     Minutes Tracking:  Time In: 0593  Time Out: 1400  Minutes: 62  Timed Code Treatment Minutes: [address] [place], [place]       Date: 12/2/2020

## 2020-12-03 ENCOUNTER — HOSPITAL ENCOUNTER (OUTPATIENT)
Dept: PHYSICAL THERAPY | Age: 65
Setting detail: THERAPIES SERIES
Discharge: HOME | End: 2020-12-03
Payer: COMMERCIAL

## 2020-12-03 PROCEDURE — 97110 THERAPEUTIC EXERCISES: CPT

## 2020-12-03 PROCEDURE — 97140 MANUAL THERAPY 1/> REGIONS: CPT

## 2020-12-03 PROCEDURE — G0283 ELEC STIM OTHER THAN WOUND: HCPCS

## 2020-12-03 NOTE — PROGRESS NOTES
Phone: Miladis           Fax: [phone]                           Outpatient Physical Therapy                                                                            Daily Note    Patient: Rashel Link : 1955  CSN #: [id number]   Referring Practitioner:  Dr. 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 Pt reports  is happy with healing so far. Pt rates current pain a 5/10. Exercises:  Exercise 1: HEP: heel prop, ankle pumps, quad sets, heel slides  Exercise 2: SciFit bike 10 mins 3.5  Exercise 3: Step stretch 3x30 ea  Exercise 6: FSu/ LSU 6 inch SD 4 inch  Exercise 7: TKE GTB 15x  Exercise 10: Sit<>stand 2x10  Exercise 11: prone strap stretch 3x30  Exercise 12: StarTrac flex 15x2 35# 3 pl ext 2x10    Manual:  PROM: EOB and supine flex with contract relax. and prone flex stretch    Modalities:  Cryotherapy (Minutes\Location): 15' post ther-ex for edmea/ pain  E-stim (parameters): 15 mins IFC post ther-ex for pain. Assessment  Assessment: Continued to encourage stretching program 3-5x daily. Pt reports it \"just feels better some nights to sit when it hurts\". Pt educated on benefits of increased ROM. Activity Tolerance       Patient Education  Patient Education: Importance of stretching. Pt verbalized/demonstrated good understanding:     [x] Yes         [] No, pt required further clarification.      Post Treatment Pain:  5/10    Plan  Times per week: 3  Plan weeks: 6      Goals (Total # of Visits to Date: 15)      Short term goals  Time Frame for Short term goals: 3 weeks  Short term goal 2: Pt will improve L knee flexion ROM to >/= 90* to progress functional mobility. -MET  Short term goal 3: Pt will improve L knee ext to be lacking </= 10* from 0 to improve gait quality. -MET    Long term goals  Time Frame for Long term goals : 6 weeks  Long term goal 1: Pt will be independent and compliant with HEP. Long term goal 2: Pt will improve L knee ROM to 0-110* to improve gait and functional mobility. Long term goal 3: Pt will improve L hip and knee strength to >/= 4 to 4+/5 for ease of stair navigation and transfers. Long term goal 4: Pt will ambulate household and community distances without AD and with min/no antalgia to improve functional ambulation.     Minutes Tracking:  Time In: 8901  Time Out: 9464  Minutes: 59  Timed Code Treatment Minutes: Guille Conde [address] [place] [address]       Date: 12/3/2020

## 2020-12-07 ENCOUNTER — HOSPITAL ENCOUNTER (OUTPATIENT)
Dept: PHYSICAL THERAPY | Age: 65
Setting detail: THERAPIES SERIES
Discharge: HOME | End: 2020-12-07
Payer: COMMERCIAL

## 2020-12-07 PROCEDURE — G0283 ELEC STIM OTHER THAN WOUND: HCPCS

## 2020-12-07 PROCEDURE — 97110 THERAPEUTIC EXERCISES: CPT

## 2020-12-07 PROCEDURE — 97140 MANUAL THERAPY 1/> REGIONS: CPT

## 2020-12-07 NOTE — PROGRESS NOTES
Phone: Miladis           Fax: [phone]                           Outpatient Physical Therapy                                                                            Daily Note    Patient: Rashel Link : 1955  CSN #: [id number]   Referring Practitioner:  Dr. 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 Pt rates current pain a 5/10. Exercises:  Exercise 1: HEP: heel prop, ankle pumps, quad sets, heel slides  Exercise 2: SciFit bike 10 mins 3.5  Exercise 3: Step stretch 3x30 ea  Exercise 5: Heel slide 10x (slide board). with strap, heel slide, SAQ, TKE, hip abd 15xea  Exercise 6: FSu/ LSU 6 inch SD 4 inch  Exercise 7: TKE GTB 15x  Exercise 10: Sit<>stand 2x10  Exercise 11: prone strap stretch 3x30    Manual:  PROM: EOB and supine flex with contract relax. and prone flex stretch    Modalities:  Cryotherapy (Minutes\Location): 15' post ther-ex for edmea/ pain  E-stim (parameters): 15 mins IFC post ther-ex for pain. Assessment  Assessment: Prone flex PROM 89*. increased fluid gait cycle noted today with amb with SC. Will cont. Activity Tolerance  Activity Tolerance: Patient Tolerated treatment well    Patient Education  Patient Education: Continue stretching. Pt verbalized/demonstrated good understanding:     [x] Yes         [] No, pt required further clarification.      Post Treatment Pain:  4/10    Plan  Times per week: 3  Plan weeks: 6    Goals  (Total # of Visits to Date: 13)      Short term goals  Time Frame for Short term goals: 3 weeks  Short term goal 1: Pt will initiate HEP. -MET  Short term goal 2: Pt will improve L knee flexion ROM to >/= 90* to progress functional mobility. -MET  Short term goal 3: Pt will improve L knee ext to be lacking </= 10* from 0 to improve gait quality. -MET    Long term goals  Time Frame for Long term goals : 6 weeks  Long term goal 1: Pt will be independent and compliant with HEP. Long term goal 2: Pt will improve L knee ROM to 0-110* to improve gait and functional mobility. Long term goal 3: Pt will improve L hip and knee strength to >/= 4 to 4+/5 for ease of stair navigation and transfers. Long term goal 4: Pt will ambulate household and community distances without AD and with min/no antalgia to improve functional ambulation.     Minutes Tracking:  Time In: 1957  Time Out: 1864  Minutes: 59  Timed Code Treatment Minutes: [address]      Date: 12/7/2020

## 2020-12-09 ENCOUNTER — HOSPITAL ENCOUNTER (OUTPATIENT)
Dept: PHYSICAL THERAPY | Age: 65
Setting detail: THERAPIES SERIES
Discharge: HOME | End: 2020-12-09
Payer: COMMERCIAL

## 2020-12-09 PROCEDURE — 97110 THERAPEUTIC EXERCISES: CPT

## 2020-12-09 PROCEDURE — G0283 ELEC STIM OTHER THAN WOUND: HCPCS

## 2020-12-09 NOTE — PROGRESS NOTES
Phone: Miladis           Fax: [phone]                           Outpatient Physical Therapy                                                                            Daily Note    Patient: Bhavya Nash : 1955  CSN #: [id number]   Referring Practitioner:  Dr. 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 Pt states he is always sore after therapy, but nothing out of the ordinary. Pt states he has been walking more around the house without cane. Exercises:  Exercise 1: HEP: heel prop, ankle pumps, quad sets, heel slides  Exercise 2: SciFit bike 10 mins 3.5  Exercise 3: Step stretch 3x30 ea  Exercise 5: Heel slide 10x10\" with strap; heel prop x3mins  Exercise 6: FSu/ LSU 6 inch SD 4 inch  Exercise 10: Sit<>stand 2x10  Exercise 13: Slantboard stretch 3x30\"      Modalities:  Cryotherapy (Minutes\Location): 15' post ther-ex for edmea/ pain  E-stim (parameters): 15 mins IFC post ther-ex for pain. Assessment  Assessment: Pt has completed 16 PT visits to date and is currently ~6 weeks s/p L TKA. Pt ambulates short distances without SPC, but demonstrates increased trunk flexion and inadequate L knee ext d/t decreased ROM and weakness. L knee ROM measuring 8-92* at end of tx this date. Cont to encourage frequent stretching throughout the day. Pt will cont to benefit from skilled PT services to cont progressing ROM, strength, gait and overall functional mobility.     Activity Tolerance  Activity Tolerance: Patient Tolerated treatment well    Patient Education  Patient Education: Exercise progression. Pt verbalized/demonstrated good understanding:     [x] Yes         [] No, pt required further clarification. Post Treatment Pain:  4/10      Plan  Times per week: 3  Plan weeks: 6      Goals  (Total # of Visits to Date: 12)      Short term goals  Time Frame for Short term goals: 3 weeks  Short term goal 1: Pt will initiate HEP. -MET  Short term goal 2: Pt will improve L knee flexion ROM to >/= 90* to progress functional mobility. -MET  Short term goal 3: Pt will improve L knee ext to be lacking </= 10* from 0 to improve gait quality. -MET    Long term goals  Time Frame for Long term goals : 6 weeks  Long term goal 1: Pt will be independent and compliant with HEP. Long term goal 2: Pt will improve L knee ROM to 0-110* to improve gait and functional mobility. Long term goal 3: Pt will improve L hip and knee strength to >/= 4 to 4+/5 for ease of stair navigation and transfers. Long term goal 4: Pt will ambulate household and community distances without AD and with min/no antalgia to improve functional ambulation.     Minutes Tracking:  Time In: 1121  Time Out: 1225  Minutes: 64  Timed Code Treatment Minutes: Hank Chinchilla, [place], DPT     Date: 12/9/2020

## 2020-12-09 NOTE — PLAN OF CARE
[facility]           Phone: [phone]             Outpatient Physical Therapy  Fax: [phone]                                           Date: 2020  Patient: Lynn Nolen : 1955 CSN #: [id number]   Referring Practitioner:  Dr. 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 Pt ambulates short distances without SPC, but demonstrates increased trunk flexion and inadequate L knee ext d/t decreased ROM and weakness. L knee ROM measuring 8-92* at end of tx this date. Cont to encourage frequent stretching throughout the day. Pt will cont to benefit from skilled PT services to cont progressing ROM, strength, gait and overall functional mobility. Goals  Short term goals  Time Frame for Short term goals: 3 weeks  Short term goal 1: Pt will initiate HEP. -MET  Short term goal 2: Pt will improve L knee flexion ROM to >/= 90* to progress functional mobility. -MET  Short term goal 3: Pt will improve L knee ext to be lacking </= 10* from 0 to improve gait quality. -MET  Long term goals  Time Frame for Long term goals : 6 weeks  Long term goal 1: Pt will be independent and compliant with HEP. Long term goal 2: Pt will improve L knee ROM to 0-110* to improve gait and functional mobility. Long term goal 3: Pt will improve L hip and knee strength to >/= 4 to 4+/5 for ease of stair navigation and transfers.   Long term goal 4: Pt will ambulate household and community distances without AD and with min/no antalgia to improve functional ambulation.      Prognosis  Prognosis: Good    Treatment Plan   Times per week: 3  Plan weeks: 6  [x] HP/CP      [x] Electrical Stim   [x] Therapeutic Exercise      [x] Gait Training  [] Aquatics   [] Ultrasound         [x] Patient Education/HEP   [x] Manual Therapy  [] Traction    [] Neuro-jyothi        [x] Soft Tissue Mobs            [] Home TENS  [] Iontophoresis    [] Orthotic casting/fitting      [] Dry Needling             Electronically signed by: Nicolas Vila PT, DPT    Date: 12/9/2020      ______________________________________ Date: 12/9/2020   Physician Signature

## 2020-12-11 ENCOUNTER — HOSPITAL ENCOUNTER (OUTPATIENT)
Dept: PHYSICAL THERAPY | Age: 65
Setting detail: THERAPIES SERIES
Discharge: HOME | End: 2020-12-11
Payer: COMMERCIAL

## 2020-12-11 PROCEDURE — 97110 THERAPEUTIC EXERCISES: CPT

## 2020-12-11 PROCEDURE — G0283 ELEC STIM OTHER THAN WOUND: HCPCS

## 2020-12-11 NOTE — PROGRESS NOTES
Phone: Miladis           Fax: [phone]                           Outpatient Physical Therapy                                                                            Daily Note    Patient: James Crow : 1955  CSN #: [id number]   Referring Practitioner:  Dr. 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 Pt states pain level 2/10 upon arrival.    Exercises:  Exercise 2: SciFit bike 10 mins 3.5  Exercise 3: Step stretch 3x30 ea  Exercise 5: Heel slide 10x10\" with strap; heel prop x3mins  Exercise 6: FSu/ LSU 6 inch SD 6 inch  Exercise 10: Sit<>stand 2x10  Exercise 11: prone strap stretch 3x30  Exercise 12: StarTrac flex 15x2 35# ext 2x10 25#  Exercise 13: Slantboard stretch 3x30\"  Exercise 14: Seated knee flex stretch    Manual:  PROM: EOB and supine flex with contract relax. and prone flex stretch    Modalities:  Cryotherapy (Minutes\Location): 15' post ther-ex for edmea/ pain  E-stim (parameters): 15 mins IFC post ther-ex for pain. Assessment  Assessment: L knee flex AAROM 94* this date. Pt able to navigate stairs with recip pattern, but demonstrates more difficulty with descending stairs and compensates with excessive UE assistance on handrails to decrease knee flexion on L-side. Pt requires VC throughout tx to decrease compensation with both stretching and strengthening exercises. IFC and CP applied at end of tx for pain and edema.  Will progress as tolerated. Activity Tolerance  Activity Tolerance: Patient Tolerated treatment well    Patient Education  Patient Education: Avoidance of compensation with various exercises. Pt verbalized/demonstrated good understanding:     [x] Yes         [] No, pt required further clarification. Post Treatment Pain:  2/10      Plan  Times per week: 3  Plan weeks: 6      Goals  (Total # of Visits to Date: 16)      Short term goals  Time Frame for Short term goals: 3 weeks  Short term goal 1: Pt will initiate HEP. -MET  Short term goal 2: Pt will improve L knee flexion ROM to >/= 90* to progress functional mobility. -MET  Short term goal 3: Pt will improve L knee ext to be lacking </= 10* from 0 to improve gait quality. -MET    Long term goals  Time Frame for Long term goals : 6 weeks  Long term goal 1: Pt will be independent and compliant with HEP. Long term goal 2: Pt will improve L knee ROM to 0-110* to improve gait and functional mobility. Long term goal 3: Pt will improve L hip and knee strength to >/= 4 to 4+/5 for ease of stair navigation and transfers. Long term goal 4: Pt will ambulate household and community distances without AD and with min/no antalgia to improve functional ambulation.     Minutes Tracking:  Time In: 0071  Time Out: 2416  Minutes: 74  Timed Code Treatment Minutes: [address] [facility], [address] [facility], [facility]     Date: 12/11/2020

## 2020-12-14 ENCOUNTER — HOSPITAL ENCOUNTER (OUTPATIENT)
Dept: PHYSICAL THERAPY | Age: 65
Setting detail: THERAPIES SERIES
Discharge: HOME | End: 2020-12-14
Payer: COMMERCIAL

## 2020-12-14 PROCEDURE — G0283 ELEC STIM OTHER THAN WOUND: HCPCS

## 2020-12-14 PROCEDURE — 97110 THERAPEUTIC EXERCISES: CPT

## 2020-12-14 NOTE — PROGRESS NOTES
Phone: Miladis           Fax: [phone]                           Outpatient Physical Therapy                                                                            Daily Note    Patient: Ozzy Lozano : 1955  CSN #: [id number]   Referring Practitioner:  Dr. 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 Pt states he still gets sore and is real tight but overall is doing good. Pt rates current pain a 2/10. Exercises:  Exercise 1: HEP: heel prop, ankle pumps, quad sets, heel slides  Exercise 2: SciFit bike 10 mins 3.5  Exercise 3: Step stretch 3x30 ea  Exercise 5: Heel slide 10x10\" with strap; heel prop x3mins  Exercise 6: FSu/ LSU 6 inch SD 6 inch  Exercise 10: Sit<>stand 2x10  Exercise 12: StarTrac flex 15x2 35# ext 2x10 25#  Exercise 14: Seated knee flex stretch    Manual:  PROM: EOB and supine flex with contract relax. and prone flex stretch    Modalities:  Cryotherapy (Minutes\Location): 15' post ther-ex for edmea/ pain  E-stim (parameters): 15 mins IFC post ther-ex for pain. Assessment  Assessment: FLex PROM 95* today. Strength slowly increasing. Activity Tolerance  Activity Tolerance: Patient Tolerated treatment well    Patient Education  Patient Education: Cont current stretches. Pt verbalized/demonstrated good understanding:     [x] Yes         [] No, pt required further clarification.      Post Treatment Pain:  2/10    Plan  Times per week: 3  Plan weeks: 6    Goals  (Total # of Visits to Date: 25)      Short term goals  Time Frame for Short term goals: 3 weeks  Short term goal 1: Pt will initiate HEP. -MET  Short term goal 2: Pt will improve L knee flexion ROM to >/= 90* to progress functional mobility. -MET  Short term goal 3: Pt will improve L knee ext to be lacking </= 10* from 0 to improve gait quality. -MET    Long term goals  Time Frame for Long term goals : 6 weeks  Long term goal 1: Pt will be independent and compliant with HEP. Long term goal 2: Pt will improve L knee ROM to 0-110* to improve gait and functional mobility. Long term goal 3: Pt will improve L hip and knee strength to >/= 4 to 4+/5 for ease of stair navigation and transfers. Long term goal 4: Pt will ambulate household and community distances without AD and with min/no antalgia to improve functional ambulation.     Minutes Tracking:  Time In: 6402  Time Out: 9638  Minutes: 58  Timed Code Treatment Minutes: [address] [place], [place]       Date: 12/14/2020

## 2020-12-16 ENCOUNTER — HOSPITAL ENCOUNTER (OUTPATIENT)
Dept: PHYSICAL THERAPY | Age: 65
Setting detail: THERAPIES SERIES
Discharge: HOME | End: 2020-12-16
Payer: COMMERCIAL

## 2020-12-16 PROCEDURE — 97140 MANUAL THERAPY 1/> REGIONS: CPT

## 2020-12-16 PROCEDURE — 97110 THERAPEUTIC EXERCISES: CPT

## 2020-12-16 PROCEDURE — G0283 ELEC STIM OTHER THAN WOUND: HCPCS

## 2020-12-16 NOTE — PROGRESS NOTES
Phone: Miladis           Fax: [phone]                           Outpatient Physical Therapy                                                                            Daily Note    Patient: Efraín Snider : 1955  CSN #: [id number]   Referring Practitioner:  Dr. 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 Not sure if its weather or what is causing it. Pt rates pain a 2/10. Exercises:  Exercise 1: HEP: heel prop, ankle pumps, quad sets, heel slides  Exercise 2: SciFit bike 10 mins 3.5  Exercise 3: Step stretch 3x30 ea  Exercise 6: FSu 8/ LSU 6 inch SD 6 inch  Exercise 10: Sit<>stand 2x10  Exercise 12: StarTrac flex 15x2 35# ext 2x10 25#    Manual:  PROM: EOB and supine flex with contract relax. and prone flex stretch    Modalities:  Cryotherapy (Minutes\Location): 15' post ther-ex for edmea/ pain  E-stim (parameters): 15 mins IFC post ther-ex for pain. Assessment  Assessment: Progressed to 8 inch FSU today with fair tolerance. Pt encouraged to cont stretching at home for greater flex ROM. Activity Tolerance  Activity Tolerance: Patient Tolerated treatment well    Patient Education  Patient Education: Importance of stretching. Pt verbalized/demonstrated good understanding:     [x] Yes         [] No, pt required further clarification.      Post Treatment Pain:  1/10    Plan  Times per week: 3  Plan weeks: 6      Goals  (Total # of Visits to Date: 23)      Short term goals  Time Frame for Short term goals: 3 weeks  Short term goal 1: Pt will initiate HEP. -MET  Short term goal 2: Pt will improve L knee flexion ROM to >/= 90* to progress functional mobility. -MET  Short term goal 3: Pt will improve L knee ext to be lacking </= 10* from 0 to improve gait quality. -MET    Long term goals  Time Frame for Long term goals : 6 weeks  Long term goal 1: Pt will be independent and compliant with HEP. Long term goal 2: Pt will improve L knee ROM to 0-110* to improve gait and functional mobility. Long term goal 3: Pt will improve L hip and knee strength to >/= 4 to 4+/5 for ease of stair navigation and transfers. Long term goal 4: Pt will ambulate household and community distances without AD and with min/no antalgia to improve functional ambulation.     Minutes Tracking:  Time In: 0204  Time Out: 1400  Minutes: 61  Timed Code Treatment Minutes: [address] [place], [place]       Date: 12/16/2020

## 2020-12-18 ENCOUNTER — HOSPITAL ENCOUNTER (OUTPATIENT)
Dept: PHYSICAL THERAPY | Age: 65
Setting detail: THERAPIES SERIES
Discharge: HOME | End: 2020-12-18
Payer: COMMERCIAL

## 2020-12-18 PROCEDURE — G0283 ELEC STIM OTHER THAN WOUND: HCPCS

## 2020-12-18 PROCEDURE — 97110 THERAPEUTIC EXERCISES: CPT

## 2020-12-18 PROCEDURE — 97140 MANUAL THERAPY 1/> REGIONS: CPT

## 2020-12-18 NOTE — PROGRESS NOTES
Phone: [facility]           Fax: [phone]                           Outpatient Physical Therapy                                                                            Daily Note    Patient: James Crow : 1955  CSN #: [id number]   Referring Practitioner:  Dr. 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 Pt states pain level 2/10. Exercises:  Exercise 2: SciFit bike 10 mins 3.5  Exercise 3: Step stretch 3x30 ea  Exercise 5: Heel slide 10x10\" with strap; heel prop x3mins  Exercise 6: FSu 8/ LSU 6 inch SD 6 inch  Exercise 11: prone strap stretch 3x30  Exercise 14: Seated knee flex stretch    Manual:  PROM: EOB and supine flex with contract relax. and prone flex stretch    Modalities:  Cryotherapy (Minutes\Location): 15' post ther-ex for edmea/ pain  E-stim (parameters): 15 mins IFC post ther-ex for pain. Assessment  Assessment: Limited progressions of strengthening program this date, placing emphasis on stretching/ROM exercises and manual therapy. L knee flexion AAROM reaching 94*. Pt instructed in cont of both flex and ext stretching at home. Will progress as tolerated. Activity Tolerance  Activity Tolerance: Patient Tolerated treatment well    Patient Education  Patient Education: Cont of stretching  Pt verbalized/demonstrated good understanding:     [x] Yes         [] No, pt required further clarification.        Post Treatment Pain:  2/10      Plan  Times per week: 3  Plan weeks: 6      Goals (Total # of Visits to Date: 21)      Short term goals  Time Frame for Short term goals: 3 weeks  Short term goal 1: Pt will initiate HEP. -MET  Short term goal 2: Pt will improve L knee flexion ROM to >/= 90* to progress functional mobility. -MET  Short term goal 3: Pt will improve L knee ext to be lacking </= 10* from 0 to improve gait quality. -MET    Long term goals  Time Frame for Long term goals : 6 weeks  Long term goal 1: Pt will be independent and compliant with HEP. Long term goal 2: Pt will improve L knee ROM to 0-110* to improve gait and functional mobility. Long term goal 3: Pt will improve L hip and knee strength to >/= 4 to 4+/5 for ease of stair navigation and transfers. Long term goal 4: Pt will ambulate household and community distances without AD and with min/no antalgia to improve functional ambulation.     Minutes Tracking:  Time In: 1218  Time Out: 7780  Minutes: 65  Timed Code Treatment Minutes: [place], [place], DPT     Date: 12/18/2020

## 2020-12-23 ENCOUNTER — HOSPITAL ENCOUNTER (OUTPATIENT)
Dept: PHYSICAL THERAPY | Age: 65
Setting detail: THERAPIES SERIES
Discharge: HOME | End: 2020-12-23
Payer: COMMERCIAL

## 2020-12-23 PROCEDURE — 97110 THERAPEUTIC EXERCISES: CPT

## 2020-12-23 PROCEDURE — G0283 ELEC STIM OTHER THAN WOUND: HCPCS

## 2020-12-23 PROCEDURE — 97140 MANUAL THERAPY 1/> REGIONS: CPT

## 2020-12-23 NOTE — PROGRESS NOTES
Phone: [facility]           Fax: [phone]                           Outpatient Physical Therapy                                                                            Daily Note    Patient: Naun Mason : 1955  CSN #: [id number]   Referring Practitioner:  Dr. 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 Pt rates current pain a 2/10. Exercises:  Exercise 1: HEP: heel prop, ankle pumps, quad sets, heel slides  Exercise 2: SciFit bike 10 mins 3.5  Exercise 6: FSu 8/ LSU 6 inch SD 6 inch  Exercise 10: Sit<>stand 2x10  Exercise 12: StarTrac flex 15x2 6 pl  ext 2x10 25#  Exercise 14: Seated knee flex stretch    Manual:  PROM: EOB and supine flex with contract relax. and prone flex stretch    Modalities:  Cryotherapy (Minutes\Location): 15' post ther-ex for edmea/ pain  E-stim (parameters): 15 mins IFC post ther-ex for pain. Assessment  Assessment: Pt required less UE assist to step up 8 inch step today. No AD today with step to gait noted during amb. Will cont to progress. Activity Tolerance  Activity Tolerance: Patient Tolerated treatment well    Patient Education  Patient Education: Cont of stretching  Pt verbalized/demonstrated good understanding:     [x] Yes         [] No, pt required further clarification.      Post Treatment Pain:  2/10    Plan  Times per week: 3  Plan weeks: 6    Goals  (Total # of Visits to Date: 24)      Short term goals  Time Frame for Short term goals: 3 Spoke with mom and rescheduled appt form today to 8/14 at 9:30am virtually w Dr. Robles. Mom verbalized understanding.    weeks  Short term goal 1: Pt will initiate HEP. -MET  Short term goal 2: Pt will improve L knee flexion ROM to >/= 90* to progress functional mobility. -MET  Short term goal 3: Pt will improve L knee ext to be lacking </= 10* from 0 to improve gait quality. -MET    Long term goals  Time Frame for Long term goals : 6 weeks  Long term goal 1: Pt will be independent and compliant with HEP. Long term goal 2: Pt will improve L knee ROM to 0-110* to improve gait and functional mobility. Long term goal 3: Pt will improve L hip and knee strength to >/= 4 to 4+/5 for ease of stair navigation and transfers. Long term goal 4: Pt will ambulate household and community distances without AD and with min/no antalgia to improve functional ambulation.     Minutes Tracking:  Time In: 9080  Time Out: 1400  Minutes: 61  Timed Code Treatment Minutes: [address] [place], [place]       Date: 12/23/2020

## 2020-12-28 ENCOUNTER — HOSPITAL ENCOUNTER (OUTPATIENT)
Dept: PHYSICAL THERAPY | Age: 65
Setting detail: THERAPIES SERIES
Discharge: HOME | End: 2020-12-28
Payer: COMMERCIAL

## 2020-12-28 PROCEDURE — 97110 THERAPEUTIC EXERCISES: CPT

## 2020-12-28 PROCEDURE — G0283 ELEC STIM OTHER THAN WOUND: HCPCS

## 2020-12-28 NOTE — PROGRESS NOTES
per week: 3  Plan weeks: 6      Goals  (Total # of Visits to Date: 25)      Short term goals  Time Frame for Short term goals: 3 weeks  Short term goal 1: Pt will initiate HEP. -MET  Short term goal 2: Pt will improve L knee flexion ROM to >/= 90* to progress functional mobility. -MET  Short term goal 3: Pt will improve L knee ext to be lacking </= 10* from 0 to improve gait quality. -MET    Long term goals  Time Frame for Long term goals : 6 weeks  Long term goal 1: Pt will be independent and compliant with HEP. Long term goal 2: Pt will improve L knee ROM to 0-110* to improve gait and functional mobility. Long term goal 3: Pt will improve L hip and knee strength to >/= 4 to 4+/5 for ease of stair navigation and transfers. Long term goal 4: Pt will ambulate household and community distances without AD and with min/no antalgia to improve functional ambulation.     Minutes Tracking:  Time In: 7405  Time Out: [address]  Minutes: 62  Timed Code Treatment Minutes: [address], [address] [facility], [facility]     Date: 12/28/2020

## 2020-12-30 ENCOUNTER — HOSPITAL ENCOUNTER (OUTPATIENT)
Dept: PHYSICAL THERAPY | Age: 65
Setting detail: THERAPIES SERIES
Discharge: HOME | End: 2020-12-30
Payer: COMMERCIAL

## 2020-12-30 PROCEDURE — 97110 THERAPEUTIC EXERCISES: CPT

## 2020-12-30 PROCEDURE — G0283 ELEC STIM OTHER THAN WOUND: HCPCS

## 2020-12-30 NOTE — PROGRESS NOTES
Phone: [facility]           Fax: [phone]                           Outpatient Physical Therapy                                                                            Daily Note    Patient: Naun Mason : 1955  CSN #: [id number]   Referring Practitioner:  Dr. 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 Exercises:  Exercise 2: SciFit bike 10 mins 3.5  Exercise 3: Step stretch 3x30 ea  Exercise 5: Heel slide 10x10\" with strap; heel prop x3mins  Exercise 6: FSu 8/ LSU 8 inch SD 6 inch x15ea  Exercise 10: Sit<>stand 2x10  Exercise 11: prone strap stretch 3x30  Exercise 15: Leg press DL 6pl x15  Exercise 16: Prone hang for ext x3mins    Modalities:  Cryotherapy (Minutes\Location): 15' post ther-ex for edmea/ pain  E-stim (parameters): 15 mins IFC post ther-ex for pain. Assessment  Assessment: Initiated leg press to progress quad and LE strength, which pt tolerates well but requires occasional VC for proper form and to avoid compensation. Pt also instructed in prone hang for knee ext ROM, as he cont to lack full ext, which contributes to bent knee gait. Pt instructed to cont with both flexion and ext stretching frequently at home for cont progression of ROM. IFC and CP applied at end of tx for pain. Will progress as tolerated. Activity Tolerance  Activity Tolerance: Patient Tolerated treatment well    Patient Education  Patient Education: New exercises.   Pt verbalized/demonstrated good understanding:     [x] Yes         [] No, pt required further clarification. Post Treatment Pain:  0/10      Plan  Times per week: 3  Plan weeks: 6      Goals  (Total # of Visits to Date: 21)      Short term goals  Time Frame for Short term goals: 3 weeks  Short term goal 1: Pt will initiate HEP. -MET  Short term goal 2: Pt will improve L knee flexion ROM to >/= 90* to progress functional mobility. -MET  Short term goal 3: Pt will improve L knee ext to be lacking </= 10* from 0 to improve gait quality. -MET    Long term goals  Time Frame for Long term goals : 6 weeks  Long term goal 1: Pt will be independent and compliant with HEP. Long term goal 2: Pt will improve L knee ROM to 0-110* to improve gait and functional mobility. Long term goal 3: Pt will improve L hip and knee strength to >/= 4 to 4+/5 for ease of stair navigation and transfers. Long term goal 4: Pt will ambulate household and community distances without AD and with min/no antalgia to improve functional ambulation.     Minutes Tracking:  Time In: 3680  Time Out: 1050  Minutes: 62  Timed Code Treatment Minutes: [address], [address] [facility], [facility]     Date: 12/30/2020

## 2021-01-05 ENCOUNTER — HOSPITAL ENCOUNTER (OUTPATIENT)
Dept: PHYSICAL THERAPY | Age: 66
Setting detail: THERAPIES SERIES
Discharge: HOME | End: 2021-01-05
Payer: MEDICARE

## 2021-01-05 PROCEDURE — 97110 THERAPEUTIC EXERCISES: CPT

## 2021-01-05 PROCEDURE — G0283 ELEC STIM OTHER THAN WOUND: HCPCS

## 2021-01-05 NOTE — PROGRESS NOTES
Phone: Miladis           Fax: [phone]                           Outpatient Physical Therapy                                                                            Daily Note    Patient: Gadiel Mills : 1955  CSN #: [id number]   Referring Practitioner:  Dr. 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 Pt states he seems to be moving in the right direction. Exercises:  Exercise 2: SciFit bike 10 mins 3.5  Exercise 3: Step stretch 3x30 ea  Exercise 6: FSu 8/ LSU 8 inch SD 6 inch x15ea  Exercise 10: Sit<>stand 2x12  Exercise 12: StarTrac flex 15x2 6 pl  ext 2x10 25#  Exercise 13: Slantboard stretch 3x30\"  Exercise 14: Seated knee flex stretch  Exercise 15: Leg press DL 6pl x15    Modalities:  Cryotherapy (Minutes\Location): 15' post ther-ex for edmea/ pain  E-stim (parameters): 15 mins IFC post ther-ex for pain. Assessment  Assessment: Pt L knee flexion AROM 92* today. Focused on knee strength to return to full ADLs. Activity Tolerance  Activity Tolerance: Patient Tolerated treatment well    Patient Education  Patient Education: Cont current HEP  Pt verbalized/demonstrated good understanding:     [x] Yes         [] No, pt required further clarification.      Post Treatment Pain:  0/10    Plan  Times per week: 3  Plan weeks: 6    Goals  (Total # of Visits to Date: 25)      Short term goals  Time Frame for Short term goals: 3 weeks  Short term goal 1: Pt will initiate HEP. -MET  Short term goal 2: Pt will improve L knee flexion ROM to >/= 90* to progress functional mobility. -MET  Short term goal 3: Pt will improve L knee ext to be lacking </= 10* from 0 to improve gait quality. -MET    Long term goals  Time Frame for Long term goals : 6 weeks  Long term goal 1: Pt will be independent and compliant with HEP. Long term goal 2: Pt will improve L knee ROM to 0-110* to improve gait and functional mobility. Long term goal 3: Pt will improve L hip and knee strength to >/= 4 to 4+/5 for ease of stair navigation and transfers. Long term goal 4: Pt will ambulate household and community distances without AD and with min/no antalgia to improve functional ambulation.     Minutes Tracking:  Time In: 0912  Time Out: 1419  Minutes: 62  Timed Code Treatment Minutes: [address] [place], [place]       Date: 1/5/2021

## 2021-01-08 ENCOUNTER — HOSPITAL ENCOUNTER (OUTPATIENT)
Dept: PHYSICAL THERAPY | Age: 66
Setting detail: THERAPIES SERIES
Discharge: HOME | End: 2021-01-08
Payer: MEDICARE

## 2021-01-08 PROCEDURE — G0283 ELEC STIM OTHER THAN WOUND: HCPCS

## 2021-01-08 PROCEDURE — 97110 THERAPEUTIC EXERCISES: CPT

## 2021-01-08 NOTE — PROGRESS NOTES
Phone: Miladis           Fax: [phone]                           Outpatient Physical Therapy                                                                            Daily Note    Patient: Vilma Godinez : 1955  CSN #: [id number]   Referring Practitioner:  Dr. 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 He isnt sure why but hes feeling a little more pain today. Pt rates current pain a 1/10. Exercises:  Exercise 1: HEP: heel prop, ankle pumps, quad sets, heel slides  Exercise 2: SciFit bike 10 mins 3.5  Exercise 3: Step stretch 3x30 ea  Exercise 6: FSu 8/ LSU 8 inch SD 6 inch x15ea  Exercise 10: Sit<>stand 2x12  Exercise 12: StarTrac flex 15x2 6 pl  ext 2x10 25#  Exercise 14: Seated knee flex stretch  Exercise 15: Leg press DL 6pl x15  Exercise 16: Prone hang for ext x3mins    Modalities:  Cryotherapy (Minutes\Location): 15' post ther-ex for edmea/ pain  E-stim (parameters): 15 mins IFC post ther-ex for pain. Assessment  Assessment: Pt continues to require 1 HR for step ups. Plan is to finish out whats currently scheduled then d/c to HEP. Activity Tolerance  Activity Tolerance: Patient Tolerated treatment well    Patient Education  Patient Education: Cont current HEP  Pt verbalized/demonstrated good understanding:     [x] Yes         [] No, pt required further clarification.      Post Treatment Pain:  1/10    Plan  Times per week: 3  Plan weeks: 6    Goals  (Total # of Visits to Date: 22)      Short

## 2021-01-12 ENCOUNTER — HOSPITAL ENCOUNTER (OUTPATIENT)
Dept: PHYSICAL THERAPY | Age: 66
Setting detail: THERAPIES SERIES
Discharge: HOME | End: 2021-01-12
Payer: MEDICARE

## 2021-01-12 PROCEDURE — 97110 THERAPEUTIC EXERCISES: CPT

## 2021-01-12 PROCEDURE — G0283 ELEC STIM OTHER THAN WOUND: HCPCS

## 2021-01-12 NOTE — PROGRESS NOTES
Phone: Miladis           Fax: [phone]                           Outpatient Physical Therapy                                                                            Daily Note    Patient: Sari Soares : 1955  CSN #: [id number]   Referring Practitioner:  Dr. 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 Pt denies current pain. Exercises:  Exercise 1: HEP: heel prop, ankle pumps, quad sets, heel slides  Exercise 2: SciFit bike 10 mins 3.5  Exercise 3: Step stretch 3x30 ea  Exercise 6: FSu 8/ LSU 8 inch SD 6 inch x15ea  Exercise 10: Sit<>stand 2x12  Exercise 12: StarTrac flex 15x2 6 pl  ext 2x10 25#  Exercise 14: Seated knee flex stretch  Exercise 15: Leg press DL 6pl x15  Exercise 17: Ligia taps 15x ea 6 inch    Modalities:  Cryotherapy (Minutes\Location): 15' post ther-ex for edmea/ pain  E-stim (parameters): 15 mins IFC post ther-ex for pain. Assessment  Assessment: ROM remains the same. Pt encouraged to cont stretching/ HEP to cont towards LTG. Plan is to complete last session then d/c. Activity Tolerance  Activity Tolerance: Patient Tolerated treatment well    Patient Education  Patient Education: Cont current HEP  Pt verbalized/demonstrated good understanding:     [x] Yes         [] No, pt required further clarification.      Post Treatment Pain:  0/10    Plan  Times per week: 3  Plan weeks: 6    Goals  (Total # of Visits to Date: 32)      Short term goals  Time Frame for Short term goals: 3 weeks  Short term goal 1: Pt will initiate HEP. -MET  Short term goal 2: Pt will improve L knee flexion ROM to >/= 90* to progress functional mobility. -MET  Short term goal 3: Pt will improve L knee ext to be lacking </= 10* from 0 to improve gait quality. -MET    Long term goals  Time Frame for Long term goals : 6 weeks  Long term goal 1: Pt will be independent and compliant with HEP. Long term goal 2: Pt will improve L knee ROM to 0-110* to improve gait and functional mobility. Long term goal 3: Pt will improve L hip and knee strength to >/= 4 to 4+/5 for ease of stair navigation and transfers. Long term goal 4: Pt will ambulate household and community distances without AD and with min/no antalgia to improve functional ambulation.     Minutes Tracking:  Time In: 8341  Time Out: 3961  Minutes: 59  Timed Code Treatment Minutes: [address]       Date: 1/12/2021

## 2021-01-15 ENCOUNTER — HOSPITAL ENCOUNTER (OUTPATIENT)
Dept: PHYSICAL THERAPY | Age: 66
Setting detail: THERAPIES SERIES
Discharge: HOME | End: 2021-01-15
Payer: MEDICARE

## 2021-01-15 PROCEDURE — 97110 THERAPEUTIC EXERCISES: CPT

## 2021-01-15 PROCEDURE — G0283 ELEC STIM OTHER THAN WOUND: HCPCS

## 2021-01-15 NOTE — PROGRESS NOTES
Phone: Miladis           Fax: [phone]                           Outpatient Physical Therapy                                                                            Daily Note    Patient: Irma Barney : 1955  CSN #: [id number]   Referring Practitioner:  Dr. 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 Pt denies current pain. Exercises:  Exercise 1: HEP: heel prop, ankle pumps, quad sets, heel slides  Exercise 2: SciFit bike 10 mins 3.5  Exercise 3: Step stretch 3x30 ea  Exercise 6: FSu 8/ LSU 8 inch SD 6 inch x15ea  Exercise 10: Sit<>stand 2x12    Manual:  PROM: EOB and supine flex with contract relax. and prone flex stretch    Modalities:  Cryotherapy (Minutes\Location): 15' post ther-ex for edmea/ pain  E-stim (parameters): 15 mins IFC post ther-ex for pain. Assessment  Assessment: Pt reports 100% overall improvement since begining therapy in [place] to L knee motion and strength. Pt L hamstring and quad both grossly 4+/5. Current L knee AROM lacking 3 to 92*. Plan is to d/c from formal thearpy at this time to HEP. Activity Tolerance  Activity Tolerance: Patient Tolerated treatment well    Patient Education  Patient Education: Reviewed HEP for d/c.  Pt verbalized/demonstrated good understanding:     [x] Yes         [] No, pt required further clarification.      Post Treatment Pain:  0/10    Plan  Times per week: 3  Plan weeks: 6    Goals  (Total # of Visits to

## 2021-02-24 ENCOUNTER — OFFICE VISIT (OUTPATIENT)
Dept: UROLOGY | Age: 66
End: 2021-02-24
Payer: MEDICARE

## 2021-02-24 VITALS
TEMPERATURE: 97.7 F | SYSTOLIC BLOOD PRESSURE: 152 MMHG | WEIGHT: 235 LBS | BODY MASS INDEX: 31.83 KG/M2 | HEIGHT: 72 IN | HEART RATE: 63 BPM | DIASTOLIC BLOOD PRESSURE: 90 MMHG

## 2021-02-24 DIAGNOSIS — N40.1 BPH WITH OBSTRUCTION/LOWER URINARY TRACT SYMPTOMS: Primary | ICD-10-CM

## 2021-02-24 DIAGNOSIS — Z12.5 PROSTATE CANCER SCREENING: ICD-10-CM

## 2021-02-24 DIAGNOSIS — N13.8 BPH WITH OBSTRUCTION/LOWER URINARY TRACT SYMPTOMS: Primary | ICD-10-CM

## 2021-02-24 DIAGNOSIS — R33.9 URINARY RETENTION: ICD-10-CM

## 2021-02-24 PROCEDURE — 3017F COLORECTAL CA SCREEN DOC REV: CPT | Performed by: NURSE PRACTITIONER

## 2021-02-24 PROCEDURE — G8417 CALC BMI ABV UP PARAM F/U: HCPCS | Performed by: NURSE PRACTITIONER

## 2021-02-24 PROCEDURE — G8484 FLU IMMUNIZE NO ADMIN: HCPCS | Performed by: NURSE PRACTITIONER

## 2021-02-24 PROCEDURE — 4040F PNEUMOC VAC/ADMIN/RCVD: CPT | Performed by: NURSE PRACTITIONER

## 2021-02-24 PROCEDURE — G8427 DOCREV CUR MEDS BY ELIG CLIN: HCPCS | Performed by: NURSE PRACTITIONER

## 2021-02-24 PROCEDURE — 1123F ACP DISCUSS/DSCN MKR DOCD: CPT | Performed by: NURSE PRACTITIONER

## 2021-02-24 PROCEDURE — 4004F PT TOBACCO SCREEN RCVD TLK: CPT | Performed by: NURSE PRACTITIONER

## 2021-02-24 PROCEDURE — 99213 OFFICE O/P EST LOW 20 MIN: CPT | Performed by: NURSE PRACTITIONER

## 2021-02-24 PROCEDURE — 51798 US URINE CAPACITY MEASURE: CPT | Performed by: NURSE PRACTITIONER

## 2021-02-24 ASSESSMENT — ENCOUNTER SYMPTOMS
ABDOMINAL PAIN: 0
BACK PAIN: 0
COLOR CHANGE: 0
EYE REDNESS: 0
SHORTNESS OF BREATH: 0
NAUSEA: 0
VOMITING: 0
COUGH: 0
WHEEZING: 0
CONSTIPATION: 0

## 2021-02-24 NOTE — PATIENT INSTRUCTIONS
Schedule a Vaccine  When you qualify to receive the vaccine per the [address] guidelines, call the [facility]) COVID-19 Vaccination Hotline to schedule your appointment or to get additional information about the [facility]) locations which are offering the COVID-19 vaccine. To be most effective, it's important that you receive two doses of one of the COVID-19 vaccines. -If you are receiving the Braun Peter vaccine, your second shot will be scheduled as close to 21 days after the first shot as possible. -If you are receiving the Moderna vaccine, your second shot will be scheduled as close to 28 days after the first shot as possible. [address] Vaccination Hotline: [phone]    In partnership with [facility] Dep[facility], patients can call [phone], Monday-Friday 8:00am-4:00pm for scheduling at our Hospitals. Or visit the [facility] websites for additional information of vaccine administration locations. Links to [facility]) website and Health Department websites:    [employer]/[facility]-monitoring-coronavirus-covid-19/covid-19-vaccine/[place]-vaccine    [facility].tn    [email or web address]    SURVEY:    You may be receiving a survey from [employer] regarding your visit today. Please complete the survey to enable us to provide the highest quality of care to you and your family. If you cannot score us a very good on any question, please call the office to discuss how we could have made your experience a very good one. Thank you.

## 2021-02-24 NOTE — PROGRESS NOTES
HPI:          Patient is a 72 y.o. male in no acute distress. He is alert and oriented to person, place, and time. History  2019 Referral from the ER for urinary retention. He did undergo a right knee replacement on 8/26/2019. On 8/28/2019 he did present to the ER with abdominal pain and the inability to urinate. He did have a Herzog catheter placed while in the ER for an unknown amount. Urine culture was negative for infection. Prior to surgery he did have baseline lower urinary tract symptoms of straining with urination, feelings of incomplete bladder emptying, nocturia x4, and frequency every 2 hours. He denies any incontinence, dysuria or gross hematuria. He denies any prior history of urinary retention requiring a catheter. His bowels are moving daily since surgery. He has not had any gross hematuria in the catheter bag. He is tolerating Herzog catheter without complaints. Started flomax    9/18/2019 Herzog removed     10/2019 flomax    Today  Here today to follow-up for history of urinary retention and for prostate cancer screening. He denies frequency, urgency, nocturia or further issues with retention. PVR is low, 0 mL. [facility] but [facility] have not [facility] this [facility].   Past Medical History:   Diagnosis Date    Arthritis     CAD (coronary artery disease)     [facility]congest[facility] failure) ([facility]     Hyperlipidemia     Hypertension     MI (myocardial infarction) ([facility] [address]      Past Surgical History:   Procedure Laterality Date    COLONOSCOPY      2015    HERNIA REPAIR      JOINT REPLACEMENT Right 08/2019    knee    TOTAL KNEE ARTHROPLASTY Left 10/29/2020    with fb removal tibia    TOTAL KNEE ARTHROPLASTY Left 10/29/2020    KNEE TOTAL ARTHROPLASTY-FB REMOVAL TIBIA performed by Edie Santiago MD at [facility] Encounter Medications as of 2/24/2021   Medication Sig Dispense Refill    Glucosamine-Chondroit-Vit C-Mn (GLUCOSAMINE CHONDR 500 COMPLEX PO) Take by mouth      apixaban (ELIQUIS) 5 MG TABS tablet Take 5 mg by mouth 2 times daily      senna (SENOKOT) 8.6 MG tablet Take 1 tablet by mouth daily      aspirin 81 MG chewable tablet Take 81 mg by mouth daily      amiodarone (CORDARONE) 200 MG tablet Take 200 mg by mouth daily      atorvastatin (LIPITOR) 40 MG tablet Take 40 mg by mouth daily      furosemide (LASIX) 40 MG tablet Take 40 mg by mouth 2 times daily      lisinopril (PRINIVIL;ZESTRIL) 5 MG tablet Take 5 mg by mouth daily      metoprolol tartrate (LOPRESSOR) 25 MG tablet Take 25 mg by mouth 2 times daily      spironolactone (ALDACTONE) 25 MG tablet Take 25 mg by mouth daily      [DISCONTINUED] tamsulosin (FLOMAX) 0.4 MG capsule Take 1 capsule by mouth every evening (Patient not taking: Reported on 2/24/2021) 30 capsule 11     No facility-administered encounter medications on file as of 2/24/2021. Current Outpatient Medications on File Prior to Visit   Medication Sig Dispense Refill    Glucosamine-Chondroit-Vit C-Mn (GLUCOSAMINE CHONDR 500 COMPLEX PO) Take by mouth      apixaban (ELIQUIS) 5 MG TABS tablet Take 5 mg by mouth 2 times daily      senna (SENOKOT) 8.6 MG tablet Take 1 tablet by mouth daily      aspirin 81 MG chewable tablet Take 81 mg by mouth daily      amiodarone (CORDARONE) 200 MG tablet Take 200 mg by mouth daily      atorvastatin (LIPITOR) 40 MG tablet Take 40 mg by mouth daily      furosemide (LASIX) 40 MG tablet Take 40 mg by mouth 2 times daily      lisinopril (PRINIVIL;ZESTRIL) 5 MG tablet Take 5 mg by mouth daily      metoprolol tartrate (LOPRESSOR) 25 MG tablet Take 25 mg by mouth 2 times daily      spironolactone (ALDACTONE) 25 MG tablet Take 25 mg by mouth daily       No current facility-administered medications on file prior to visit. Patient has no known allergies.   Family History   Problem Relation Age of Onset    Heart Attack Father      Social History     Tobacco Use   Smoking Status Never Smoker   Smokeless Tobacco Current User    Types: Snuff       Social History     Substance and Sexual Activity   Alcohol Use Yes    Alcohol/week: 1.0 standard drinks    Types: 1 Glasses of wine per week    Comment: beer       Review of Systems   Constitutional: Negative for appetite change, chills and fever. Eyes: Negative for redness and visual disturbance. Respiratory: Negative for cough, shortness of breath and wheezing. Cardiovascular: Negative for chest pain and leg swelling. Gastrointestinal: Negative for abdominal pain, constipation, nausea and vomiting. Genitourinary: Negative for decreased urine volume, difficulty urinating, discharge, dysuria, enuresis, flank pain, frequency, hematuria, penile pain, scrotal swelling, testicular pain and urgency. Musculoskeletal: Negative for back pain, joint swelling and myalgias. Skin: Negative for color change, rash and wound. Neurological: Negative for dizziness, tremors and numbness. Hematological: Negative for adenopathy. Does not bruise/bleed easily. BP (!) 152/90 (Site: Right Lower Arm, Position: Sitting, Cuff Size: Medium Adult)   Pulse 63   Temp 97.7 °F (36.5 °C)   Ht 6' (1.829 m)   Wt 235 lb (106.6 kg)   BMI 31.87 kg/m²       PHYSICAL EXAM:  Constitutional: Patient in no acute distress; Neuro: alert and oriented to person place and time. Psych: Mood and affect normal.  Skin: Normal  Lungs: Respiratory effort normal  Cardiovascular:  Normal peripheral pulses  Abdomen: Soft, non-tender, non-distended with no CVA, flank pain  Bladder non-tender and not distended. Lab Results   Component Value Date    BUN 18 09/18/2020     Lab Results   Component Value Date    CREATININE 0.95 09/18/2020     No results found for: PSA    ASSESSMENT:   Diagnosis Orders   1. BPH with obstruction/lower urinary tract symptoms  PA MEASUREMENT,POST-VOID RESIDUAL VOLUME BY US,NON-IMAGING   2. Prostate cancer screening  PSA screening   3. Urinary retention  AL MEASUREMENT,POST-VOID RESIDUAL VOLUME BY US,NON-IMAGING         PLAN:  We will check a PSA and call him with these results. We will see him in 1 year with a PSA prior or sooner if needed for new or worsening symptoms.

## 2021-02-25 ENCOUNTER — HOSPITAL ENCOUNTER (OUTPATIENT)
Age: 66
Discharge: HOME | End: 2021-02-25
Payer: MEDICARE

## 2021-02-25 DIAGNOSIS — Z12.5 PROSTATE CANCER SCREENING: ICD-10-CM

## 2021-02-25 LAB — PROSTATE SPECIFIC ANTIGEN: 3.22 UG/L

## 2021-02-25 PROCEDURE — 36415 COLL VENOUS BLD VENIPUNCTURE: CPT

## 2021-02-25 PROCEDURE — G0103 PSA SCREENING: HCPCS

## 2021-08-09 ENCOUNTER — HOSPITAL ENCOUNTER (OUTPATIENT)
Age: 66
Discharge: HOME | End: 2021-08-09
Payer: MEDICARE

## 2021-08-09 LAB
ALBUMIN SERPL-MCNC: 4.3 G/DL (ref 3.5–5.2)
ALBUMIN/GLOBULIN RATIO: 1.3 (ref 1–2.5)
ALP BLD-CCNC: 97 U/L (ref 40–129)
ALT SERPL-CCNC: 20 U/L (ref 5–41)
ANION GAP SERPL CALCULATED.3IONS-SCNC: 12 MMOL/L (ref 9–17)
AST SERPL-CCNC: 20 U/L
BILIRUB SERPL-MCNC: 0.47 MG/DL (ref 0.3–1.2)
BILIRUBIN URINE: NEGATIVE
BUN BLDV-MCNC: 18 MG/DL (ref 8–23)
BUN/CREAT BLD: 16 (ref 9–20)
CALCIUM SERPL-MCNC: 9.3 MG/DL (ref 8.6–10.4)
CHLORIDE BLD-SCNC: 104 MMOL/L (ref 98–107)
CHOLESTEROL/HDL RATIO: 2.9
CHOLESTEROL: 167 MG/DL
CO2: 25 MMOL/L (ref 20–31)
COLOR: YELLOW
COMMENT UA: ABNORMAL
CREAT SERPL-MCNC: 1.11 MG/DL (ref 0.7–1.2)
GFR AFRICAN AMERICAN: >60 ML/MIN
GFR NON-AFRICAN AMERICAN: >60 ML/MIN
GFR SERPL CREATININE-BSD FRML MDRD: NORMAL ML/MIN/{1.73_M2}
GFR SERPL CREATININE-BSD FRML MDRD: NORMAL ML/MIN/{1.73_M2}
GLUCOSE BLD-MCNC: 93 MG/DL (ref 70–99)
GLUCOSE URINE: NEGATIVE
HDLC SERPL-MCNC: 57 MG/DL
KETONES, URINE: NEGATIVE
LDL CHOLESTEROL: 83 MG/DL (ref 0–130)
LEUKOCYTE ESTERASE, URINE: NEGATIVE
NITRITE, URINE: NEGATIVE
PH UA: 6 (ref 5–9)
POTASSIUM SERPL-SCNC: 4.5 MMOL/L (ref 3.7–5.3)
PROTEIN UA: NEGATIVE
SODIUM BLD-SCNC: 141 MMOL/L (ref 135–144)
SPECIFIC GRAVITY UA: 1.02 (ref 1.01–1.02)
TOTAL PROTEIN: 7.6 G/DL (ref 6.4–8.3)
TRIGL SERPL-MCNC: 134 MG/DL
TURBIDITY: CLEAR
URINE HGB: NEGATIVE
UROBILINOGEN, URINE: NORMAL
VLDLC SERPL CALC-MCNC: NORMAL MG/DL (ref 1–30)

## 2021-08-09 PROCEDURE — 36415 COLL VENOUS BLD VENIPUNCTURE: CPT

## 2021-08-09 PROCEDURE — 81003 URINALYSIS AUTO W/O SCOPE: CPT

## 2021-08-09 PROCEDURE — 80053 COMPREHEN METABOLIC PANEL: CPT

## 2021-08-09 PROCEDURE — 80061 LIPID PANEL: CPT

## 2021-08-19 NOTE — DISCHARGE SUMMARY
Phone: [facility]          Fax: [phone]                            Outpatient Physical Therapy                                                                    Discharge Summary    Patient: Marie Cervantes  : 1955  CSN #: [id number]   Referring physician: No admitting provider for patient encounter. Referring Practitioner: Dr. 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 (T37.527)      Date Treatment Initiated: 2020  Date of Last Treatment: 1/15/2021      PT Visit Information  Onset Date: 10/29/20  PT Insurance Information: [employer]   Total # of Visits Approved: 24  Total # of Visits to Date: 32  Plan of Care/Certification Expiration Date: 21  No Show: 0  Canceled Appointment: 0      Frequency/Duration  3 times per week  6 weeks      Treatment Received  [x] HP/CP      [x] Electrical Stim   [x] Therapeutic Exercise      [x] Gait Training  [] Aquatics   [] Ultrasound         [x] Patient Education/HEP   [x] Manual Therapy  [] Traction    [x] Neuro-jyothi        [x] Soft Tissue Mobs            [] Home TENS  [] Iontophoresis    [] Orthotic casting/fitting      [] Dry Needling    Assessment  Assessment: Patient attended 27 PT visits for L knee pain and met goals for independence with HEP, improved L LE strength and stability and improved tolerance to ambulation and made good progress toward goal for improved L knee ROM. Patient then dc'd to HEP. Will dc patient at this time d/t meeting goals. Goals  Short term goals  Time Frame for Short term goals: 3 weeks  Short term goal 1: Pt will initiate HEP. -MET  Short term goal 2: Pt will improve L knee flexion ROM to >/= 90* to progress functional mobility. -MET  Short term goal 3: Pt will improve L knee ext to be lacking </= 10* from 0 to improve gait quality.  -MET    Long term goals  Time Frame for Long term goals : 6 weeks  Long term goal 1: Pt will be independent and compliant with HEP. -met  Long term goal 2: Pt will improve L knee ROM to 0-110* to improve gait and functional mobility. -Not Met  Long term goal 3: Pt will improve L hip and knee strength to >/= 4 to 4+/5 for ease of stair navigation and transfers. -MET  Long term goal 4: Pt will ambulate household and community distances without AD and with min/no antalgia to improve functional ambulation.  -MET      Reason for Discharge  [x] Goals Achieved                        []  Poor Follow Through/Attendance                  [x]  Optimal Function Achieved     []  Patient Discharged Self    []  Hospitalization                         []  Physician discharge      Thank you for this referral      Mary Valverde PT, DPT               Date: 8/19/2021

## 2022-02-25 ENCOUNTER — TELEPHONE (OUTPATIENT)
Dept: UROLOGY | Age: 67
End: 2022-02-25

## 2022-02-25 DIAGNOSIS — Z12.5 PROSTATE CANCER SCREENING: Primary | ICD-10-CM

## 2022-02-28 ENCOUNTER — HOSPITAL ENCOUNTER (OUTPATIENT)
Age: 67
Discharge: HOME | End: 2022-02-28
Payer: MEDICARE

## 2022-02-28 DIAGNOSIS — Z12.5 PROSTATE CANCER SCREENING: ICD-10-CM

## 2022-02-28 LAB — PROSTATE SPECIFIC ANTIGEN: 3.72 UG/L

## 2022-02-28 PROCEDURE — G0103 PSA SCREENING: HCPCS

## 2022-02-28 PROCEDURE — 36415 COLL VENOUS BLD VENIPUNCTURE: CPT

## 2022-03-01 ENCOUNTER — OFFICE VISIT (OUTPATIENT)
Dept: UROLOGY | Age: 67
End: 2022-03-01
Payer: MEDICARE

## 2022-03-01 ENCOUNTER — HOSPITAL ENCOUNTER (OUTPATIENT)
Age: 67
Setting detail: SPECIMEN
Discharge: HOME | End: 2022-03-01
Payer: MEDICARE

## 2022-03-01 VITALS
WEIGHT: 257 LBS | DIASTOLIC BLOOD PRESSURE: 89 MMHG | SYSTOLIC BLOOD PRESSURE: 151 MMHG | HEIGHT: 72 IN | HEART RATE: 55 BPM | TEMPERATURE: 97.7 F | BODY MASS INDEX: 34.81 KG/M2

## 2022-03-01 DIAGNOSIS — N40.1 BPH WITH OBSTRUCTION/LOWER URINARY TRACT SYMPTOMS: Primary | ICD-10-CM

## 2022-03-01 DIAGNOSIS — Z12.5 PROSTATE CANCER SCREENING: ICD-10-CM

## 2022-03-01 DIAGNOSIS — R35.1 NOCTURIA: ICD-10-CM

## 2022-03-01 DIAGNOSIS — N13.8 BPH WITH OBSTRUCTION/LOWER URINARY TRACT SYMPTOMS: ICD-10-CM

## 2022-03-01 DIAGNOSIS — N40.1 BPH WITH OBSTRUCTION/LOWER URINARY TRACT SYMPTOMS: ICD-10-CM

## 2022-03-01 DIAGNOSIS — N13.8 BPH WITH OBSTRUCTION/LOWER URINARY TRACT SYMPTOMS: Primary | ICD-10-CM

## 2022-03-01 LAB
-: ABNORMAL
AMORPHOUS: ABNORMAL
BILIRUBIN URINE: NEGATIVE
COLOR: YELLOW
EPITHELIAL CELLS UA: ABNORMAL /HPF (ref 0–5)
GLUCOSE URINE: NEGATIVE
KETONES, URINE: NEGATIVE
LEUKOCYTE ESTERASE, URINE: NEGATIVE
MUCUS: ABNORMAL
NITRITE, URINE: NEGATIVE
PH UA: 6 (ref 5–9)
PROTEIN UA: NEGATIVE
RBC UA: ABNORMAL /HPF (ref 0–2)
SPECIFIC GRAVITY UA: >1.03 (ref 1.01–1.02)
TURBIDITY: CLEAR
URINE HGB: NEGATIVE
UROBILINOGEN, URINE: NORMAL
WBC UA: ABNORMAL /HPF (ref 0–5)

## 2022-03-01 PROCEDURE — 87086 URINE CULTURE/COLONY COUNT: CPT

## 2022-03-01 PROCEDURE — PBSHW PR MEASUREMENT,POST-VOID RESIDUAL VOLUME BY US,NON-IMAGING: Performed by: NURSE PRACTITIONER

## 2022-03-01 PROCEDURE — 51798 US URINE CAPACITY MEASURE: CPT | Performed by: NURSE PRACTITIONER

## 2022-03-01 PROCEDURE — 99214 OFFICE O/P EST MOD 30 MIN: CPT | Performed by: NURSE PRACTITIONER

## 2022-03-01 PROCEDURE — 3017F COLORECTAL CA SCREEN DOC REV: CPT | Performed by: NURSE PRACTITIONER

## 2022-03-01 PROCEDURE — 81001 URINALYSIS AUTO W/SCOPE: CPT

## 2022-03-01 PROCEDURE — G8427 DOCREV CUR MEDS BY ELIG CLIN: HCPCS | Performed by: NURSE PRACTITIONER

## 2022-03-01 PROCEDURE — 1123F ACP DISCUSS/DSCN MKR DOCD: CPT | Performed by: NURSE PRACTITIONER

## 2022-03-01 PROCEDURE — G8417 CALC BMI ABV UP PARAM F/U: HCPCS | Performed by: NURSE PRACTITIONER

## 2022-03-01 PROCEDURE — G8484 FLU IMMUNIZE NO ADMIN: HCPCS | Performed by: NURSE PRACTITIONER

## 2022-03-01 PROCEDURE — 4040F PNEUMOC VAC/ADMIN/RCVD: CPT | Performed by: NURSE PRACTITIONER

## 2022-03-01 PROCEDURE — 4004F PT TOBACCO SCREEN RCVD TLK: CPT | Performed by: NURSE PRACTITIONER

## 2022-03-01 RX ORDER — TAMSULOSIN HYDROCHLORIDE 0.4 MG/1
0.4 CAPSULE ORAL DAILY
Qty: 30 CAPSULE | Refills: 1 | Status: SHIPPED | OUTPATIENT
Start: 2022-03-01

## 2022-03-01 RX ORDER — PSEUDOEPHEDRINE HCL 30 MG
100 TABLET ORAL DAILY
COMMUNITY

## 2022-03-01 ASSESSMENT — ENCOUNTER SYMPTOMS
ABDOMINAL PAIN: 0
CONSTIPATION: 0
COUGH: 0
EYE REDNESS: 0
VOMITING: 0
WHEEZING: 0
NAUSEA: 0
BACK PAIN: 0
COLOR CHANGE: 0
SHORTNESS OF BREATH: 0

## 2022-03-01 NOTE — PATIENT INSTRUCTIONS
Stop fluids two hours prior to bed time       SURVEY:    You may be receiving a survey from [employer] regarding your visit today. Please complete the survey to enable us to provide the highest quality of care to you and your family. If you cannot score us a very good on any question, please call the office to discuss how we could have made your experience a very good one. Thank you.   Orin

## 2022-03-01 NOTE — PROGRESS NOTES
HPI:          Patient is a 77 y.o. male in no acute distress. He is alert and oriented to person, place, and time. History  2019 Referral from the ER for urinary retention. He did undergo a right knee replacement on 8/26/2019. On 8/28/2019 he did present to the ER with abdominal pain and the inability to urinate. He did have a Herzog catheter placed while in the ER for an unknown amount. Urine culture was negative for infection. Prior to surgery he did have baseline lower urinary tract symptoms of straining with urination, feelings of incomplete bladder emptying, nocturia x4, and frequency every 2 hours. He denies any incontinence, dysuria or gross hematuria. He denies any prior history of urinary retention requiring a catheter. His bowels are moving daily since surgery. He has not had any gross hematuria in the catheter bag. He is tolerating Herzog catheter without complaints. Started flomax    9/2019 Herzog removed     10/2019 flomax    Today  Here today to follow-up for BPH and for prostate cancer screening. Most recent PSA is 3.72. He denies unintentional weight loss, decreased energy or appetite, new or worsening bone/hip/back pain. He denies any lower extremity numbness and tingling. He planing of nocturia x3, post void dribbling, and feelings of incomplete bladder emptying. PVR is low,0ml. He denies gross hematuria or dysuria. He is having daily bowel movements.       Past Medical History:   Diagnosis Date    Arthritis     CAD (coronary artery disease)     CHF (congestive heart failure) (HCC)     Hyperlipidemia     Hypertension     MI [facility]myocardial infarction) ([facility] [address]      Past Surgical History:   Procedure Laterality Date    COLONOSCOPY      2015    HERNIA REPAIR      JOINT REPLACEMENT Right 08/2019    knee    TOTAL KNEE ARTHROPLASTY Left 10/29/2020    with fb removal tibia    TOTAL KNEE ARTHROPLASTY Left 10/29/2020    KNEE TOTAL ARTHROPLASTY-FB REMOVAL TIBIA performed by Adan Brumfield Trinidad Kennedy MD at [facility] Encounter Medications as of 3/1/2022   Medication Sig Dispense Refill    Glucosamine-Chondroit-Vit C-Mn (GLUCOSAMINE CHONDR 500 COMPLEX PO) Take by mouth      apixaban (ELIQUIS) 5 MG TABS tablet Take 5 mg by mouth 2 times daily      senna (SENOKOT) 8.6 MG tablet Take 1 tablet by mouth daily      aspirin 81 MG chewable tablet Take 81 mg by mouth daily      amiodarone (CORDARONE) 200 MG tablet Take 200 mg by mouth daily      atorvastatin (LIPITOR) 40 MG tablet Take 40 mg by mouth daily      furosemide (LASIX) 40 MG tablet Take 40 mg by mouth 2 times daily      lisinopril (PRINIVIL;ZESTRIL) 5 MG tablet Take 5 mg by mouth daily      metoprolol tartrate (LOPRESSOR) 25 MG tablet Take 25 mg by mouth 2 times daily      spironolactone (ALDACTONE) 25 MG tablet Take 25 mg by mouth daily       No facility-administered encounter medications on file as of 3/1/2022. Current Outpatient Medications on File Prior to Visit   Medication Sig Dispense Refill    Glucosamine-Chondroit-Vit C-Mn (GLUCOSAMINE CHONDR 500 COMPLEX PO) Take by mouth      apixaban (ELIQUIS) 5 MG TABS tablet Take 5 mg by mouth 2 times daily      senna (SENOKOT) 8.6 MG tablet Take 1 tablet by mouth daily      aspirin 81 MG chewable tablet Take 81 mg by mouth daily      amiodarone (CORDARONE) 200 MG tablet Take 200 mg by mouth daily      atorvastatin (LIPITOR) 40 MG tablet Take 40 mg by mouth daily      furosemide (LASIX) 40 MG tablet Take 40 mg by mouth 2 times daily      lisinopril (PRINIVIL;ZESTRIL) 5 MG tablet Take 5 mg by mouth daily      metoprolol tartrate (LOPRESSOR) 25 MG tablet Take 25 mg by mouth 2 times daily      spironolactone (ALDACTONE) 25 MG tablet Take 25 mg by mouth daily       No current facility-administered medications on file prior to visit. Patient has no known allergies.   Family History   Problem Relation Age of Onset    Heart Attack Father      Social History     Tobacco Use   Smoking Status Never Smoker   Smokeless Tobacco Current User    Types: Snuff       Social History     Substance and Sexual Activity   Alcohol Use Yes    Alcohol/week: 1.0 standard drink    Types: 1 Glasses of wine per week    Comment: beer       Review of Systems   Constitutional: Negative for appetite change, chills and fever. Eyes: Negative for redness and visual disturbance. Respiratory: Negative for cough, shortness of breath and wheezing. Cardiovascular: Negative for chest pain and leg swelling. Gastrointestinal: Negative for abdominal pain, constipation, nausea and vomiting. Genitourinary: Positive for difficulty urinating and frequency (nocturia). Negative for decreased urine volume, dysuria, enuresis, flank pain, hematuria, penile discharge, penile pain, scrotal swelling, testicular pain and urgency. Musculoskeletal: Negative for back pain, joint swelling and myalgias. Skin: Negative for color change, rash and wound. Neurological: Negative for dizziness, tremors and numbness. Hematological: Negative for adenopathy. Does not bruise/bleed easily. BP (!) 169/92 (Site: Right Upper Arm, Position: Sitting, Cuff Size: Large Adult)   Pulse 59   Temp 97.7 °F (36.5 °C)   Ht 6' (1.829 m)   Wt 257 lb (116.6 kg)   BMI 34.86 kg/m²       PHYSICAL EXAM:  Constitutional: Patient in no acute distress; Neuro: alert and oriented to person place and time. Psych: Mood and affect normal.  Skin: Normal  Lungs: Respiratory effort normal  Cardiovascular:  Normal peripheral pulses  Abdomen: Soft, non-tender, non-distended with no CVA, flank pain  Bladder non-tender and not distended. Lab Results   Component Value Date    BUN 18 08/09/2021     Lab Results   Component Value Date    CREATININE 1.11 08/09/2021     Lab Results   Component Value Date    PSA 3.72 02/28/2022    PSA 3.22 02/25/2021       ASSESSMENT:   Diagnosis Orders   1.  BPH with obstruction/lower urinary tract symptoms  MO MEASUREMENT,POST-VOID RESIDUAL VOLUME BY US,NON-IMAGING    Urinalysis with Microscopic    Culture, Urine   2. Nocturia  KY MEASUREMENT,POST-VOID RESIDUAL VOLUME BY US,NON-IMAGING    Urinalysis with Microscopic    Culture, Urine   3.  Prostate cancer screening  PSA Screening         PLAN:  We will check a UA C&S    He will start Flomax daily    We will see him back in the office in 6 weeks to reevaluate his urinary symptoms    PSA will be due in 1 year

## 2022-03-02 LAB
CULTURE: NO GROWTH
SPECIMEN DESCRIPTION: NORMAL

## 2022-03-03 ENCOUNTER — TELEPHONE (OUTPATIENT)
Dept: UROLOGY | Age: 67
End: 2022-03-03

## 2022-03-03 NOTE — TELEPHONE ENCOUNTER
----- Message from SHELBIE Lawson - CNP sent at 3/3/2022  8:35 AM EST -----  Call pt - urine cx reviewed and negative for UTI & for significant microhematuria

## 2022-04-15 ENCOUNTER — OFFICE VISIT (OUTPATIENT)
Dept: UROLOGY | Age: 67
End: 2022-04-15
Payer: MEDICARE

## 2022-04-15 VITALS
SYSTOLIC BLOOD PRESSURE: 159 MMHG | DIASTOLIC BLOOD PRESSURE: 97 MMHG | BODY MASS INDEX: 36.08 KG/M2 | HEART RATE: 59 BPM | HEIGHT: 72 IN | WEIGHT: 266.4 LBS | RESPIRATION RATE: 18 BRPM | TEMPERATURE: 98.4 F

## 2022-04-15 DIAGNOSIS — R46.89 AGGRESSIVE BEHAVIOR OF ADULT: ICD-10-CM

## 2022-04-15 DIAGNOSIS — N40.1 BPH WITH OBSTRUCTION/LOWER URINARY TRACT SYMPTOMS: Primary | ICD-10-CM

## 2022-04-15 DIAGNOSIS — N13.8 BPH WITH OBSTRUCTION/LOWER URINARY TRACT SYMPTOMS: Primary | ICD-10-CM

## 2022-04-15 PROCEDURE — 3017F COLORECTAL CA SCREEN DOC REV: CPT | Performed by: NURSE PRACTITIONER

## 2022-04-15 PROCEDURE — 99214 OFFICE O/P EST MOD 30 MIN: CPT | Performed by: NURSE PRACTITIONER

## 2022-04-15 PROCEDURE — 1123F ACP DISCUSS/DSCN MKR DOCD: CPT | Performed by: NURSE PRACTITIONER

## 2022-04-15 PROCEDURE — 4004F PT TOBACCO SCREEN RCVD TLK: CPT | Performed by: NURSE PRACTITIONER

## 2022-04-15 PROCEDURE — 4040F PNEUMOC VAC/ADMIN/RCVD: CPT | Performed by: NURSE PRACTITIONER

## 2022-04-15 PROCEDURE — 99211 OFF/OP EST MAY X REQ PHY/QHP: CPT

## 2022-04-15 PROCEDURE — G8427 DOCREV CUR MEDS BY ELIG CLIN: HCPCS | Performed by: NURSE PRACTITIONER

## 2022-04-15 PROCEDURE — G8417 CALC BMI ABV UP PARAM F/U: HCPCS | Performed by: NURSE PRACTITIONER

## 2022-04-15 ASSESSMENT — ENCOUNTER SYMPTOMS
COLOR CHANGE: 0
EYE REDNESS: 0
WHEEZING: 0
COUGH: 0
VOMITING: 0
SHORTNESS OF BREATH: 0
NAUSEA: 0
CONSTIPATION: 0
BACK PAIN: 0
ABDOMINAL PAIN: 0

## 2022-04-15 NOTE — LETTER
[facility]OLOG[facility]  [address]  [place] [address] [place]  Phone: [phone]  Fax: [phone]    SHELBIE Webber CNP        April 15, 2022     Patient: Rosa Francisco   YOB: 1955   Date of Visit: 4/15/2022       To Whom it May Concern:    Sammieosvaldo Judesimone was seen in my clinic on 4/15/2022. He {Return to school/sport/work:25437}. If you have any questions or concerns, please don't hesitate to call.     Sincerely,         SHELBIE Webber CNP

## 2022-04-15 NOTE — PATIENT INSTRUCTIONS
SURVEY:    You may be receiving a survey from [employer] regarding your visit today. Please complete the survey to enable us to provide the highest quality of care to you and your family. If you cannot score us a very good on any question, please call the office to discuss how we could have made your experience a very good one. Thank you.

## 2022-04-15 NOTE — PROGRESS NOTES
HPI:          Patient is a 77 y.o. male in no acute distress. He is alert and oriented to person, place, and time. History  2019 Referral from the ER for urinary retention. He did undergo a right knee replacement on 8/26/2019. On 8/28/2019 he did present to the ER with abdominal pain and the inability to urinate. He did have a Herzog catheter placed while in the ER for an unknown amount. Urine culture was negative for infection. Prior to surgery he did have baseline lower urinary tract symptoms of straining with urination, feelings of incomplete bladder emptying, nocturia x4, and frequency every 2 hours. He denies any incontinence, dysuria or gross hematuria. He denies any prior history of urinary retention requiring a catheter. His bowels are moving daily since surgery. He has not had any gross hematuria in the catheter bag. He is tolerating Herzog catheter without complaints. Started flomax    9/2019 Herzog removed     10/2019 stopped flomax    3/2022 started flomax due to nocturia x3, post-void dribbling, feelings of incomplete bladder emptying    Today  Here today to follow-up for BPH and nocturia. At his last visit he was complaining of nocturia x3, post void dribbling, and feelings of incomplete bladder emptying. We did start him on Flomax daily. He was reviewed to the LPN rooming him. Upon entering the room patient was furious that we checked his blood pressure. Patient was furious that we asked for a urine specimen and asked to check his postvoid residual.  He did refuse to have us check his postvoid residual.  He berated me and yelled at me about taking his blood pressure and being upset that he was not told that he would need to give a urine specimen. I wanted to explain that monitoring blood pressure and whether or not he is emptying his bladder as part of assessing his urinary symptoms. Patient continued to yell at me and started using profanity.   I did tell the patient that we will not tolerate this behavior towards myself or my staff. Did say that his urinary symptoms have not improved on the medication. He is still complaining of nocturia x3, postvoid dribbling, and feelings of incomplete bladder emptying    Past Medical History:   Diagnosis Date    Arthritis     CAD (coronary artery disease)     CHF (congestive heart failure) (HCC)     Hyperlipidemia     Hypertension     MI (myocardial infarction) ([address]      Past Surgical History:   Procedure Laterality Date    COLONOSCOPY      2015    HERNIA REPAIR      JOINT REPLACEMENT Right 08/2019    knee    JOINT REPLACEMENT Left 11/2020    knee    TOTAL KNEE ARTHROPLASTY Left 10/29/2020    with fb removal tibia    TOTAL KNEE ARTHROPLASTY Left 10/29/2020    KNEE TOTAL ARTHROPLASTY-FB REMOVAL TIBIA performed by Vlad Pittman MD at [address] Encounter Medications as of 4/15/2022   Medication Sig Dispense Refill    docusate (COLACE, DULCOLAX) 100 MG CAPS 100 capsules daily 1 capsule daily      tamsulosin (FLOMAX) 0.4 MG capsule Take 1 capsule by mouth daily 30 capsule 1    Glucosamine-Chondroit-Vit C-Mn (GLUCOSAMINE CHONDR 500 COMPLEX PO) Take by mouth      apixaban (ELIQUIS) 5 MG TABS tablet Take 5 mg by mouth 2 times daily      aspirin 81 MG chewable tablet Take 81 mg by mouth daily      amiodarone (CORDARONE) 200 MG tablet Take 200 mg by mouth daily      atorvastatin (LIPITOR) 40 MG tablet Take 40 mg by mouth daily      furosemide (LASIX) 40 MG tablet Take 40 mg by mouth 2 times daily      lisinopril (PRINIVIL;ZESTRIL) 5 MG tablet Take 5 mg by mouth daily      metoprolol tartrate (LOPRESSOR) 25 MG tablet Take 25 mg by mouth 2 times daily      spironolactone (ALDACTONE) 25 MG tablet Take 25 mg by mouth daily      senna (SENOKOT) 8.6 MG tablet Take 1 tablet by mouth daily (Patient not taking: Reported on 4/15/2022)       No facility-administered encounter medications on file as of 4/15/2022. Current Outpatient Medications on File Prior to Visit   Medication Sig Dispense Refill    docusate (COLACE, DULCOLAX) 100 MG CAPS 100 capsules daily 1 capsule daily      tamsulosin (FLOMAX) 0.4 MG capsule Take 1 capsule by mouth daily 30 capsule 1    Glucosamine-Chondroit-Vit C-Mn (GLUCOSAMINE CHONDR 500 COMPLEX PO) Take by mouth      apixaban (ELIQUIS) 5 MG TABS tablet Take 5 mg by mouth 2 times daily      aspirin 81 MG chewable tablet Take 81 mg by mouth daily      amiodarone (CORDARONE) 200 MG tablet Take 200 mg by mouth daily      atorvastatin (LIPITOR) 40 MG tablet Take 40 mg by mouth daily      furosemide (LASIX) 40 MG tablet Take 40 mg by mouth 2 times daily      lisinopril (PRINIVIL;ZESTRIL) 5 MG tablet Take 5 mg by mouth daily      metoprolol tartrate (LOPRESSOR) 25 MG tablet Take 25 mg by mouth 2 times daily      spironolactone (ALDACTONE) 25 MG tablet Take 25 mg by mouth daily      senna (SENOKOT) 8.6 MG tablet Take 1 tablet by mouth daily (Patient not taking: Reported on 4/15/2022)       No current facility-administered medications on file prior to visit. Patient has no known allergies. Family History   Problem Relation Age of Onset    Heart Attack Father      Social History     Tobacco Use   Smoking Status Never Smoker   Smokeless Tobacco Current User    Types: Snuff       Social History     Substance and Sexual Activity   Alcohol Use Yes    Alcohol/week: 1.0 standard drink    Types: 1 Glasses of wine per week    Comment: beer       Review of Systems   Constitutional: Negative for appetite change, chills and fever. Eyes: Negative for redness and visual disturbance. Respiratory: Negative for cough, shortness of breath and wheezing. Cardiovascular: Negative for chest pain and leg swelling. Gastrointestinal: Negative for abdominal pain, constipation, nausea and vomiting. Genitourinary: Positive for difficulty urinating, enuresis and frequency.  Negative for decreased urine volume, dysuria, flank pain, hematuria, penile discharge, penile pain, scrotal swelling, testicular pain and urgency. Musculoskeletal: Negative for back pain, joint swelling and myalgias. Skin: Negative for color change, rash and wound. Neurological: Negative for dizziness, tremors and numbness. Hematological: Negative for adenopathy. Does not bruise/bleed easily. BP (!) 159/97 (Site: Left Upper Arm, Position: Sitting, Cuff Size: Large Adult)   Pulse 59   Temp 98.4 °F (36.9 °C) (Infrared)   Resp 18   Ht 6' (1.829 m)   Wt 266 lb 6.4 oz (120.8 kg)   BMI 36.13 kg/m²       PHYSICAL EXAM:  Patient left before exam could be completed. Lab Results   Component Value Date    BUN 18 08/09/2021     Lab Results   Component Value Date    CREATININE 1.11 08/09/2021     Lab Results   Component Value Date    PSA 3.72 02/28/2022    PSA 3.22 02/25/2021       ASSESSMENT:   Diagnosis Orders   1. BPH with obstruction/lower urinary tract symptoms     2. Aggressive behavior of adult           PLAN:  Left the office without a full exam.  Patient was yelling and cussing at staff including the provider and the nurses. Patient will be discharged from our practice due to aggressive behavior.

## 2022-09-23 ENCOUNTER — HOSPITAL ENCOUNTER (OUTPATIENT)
Age: 67
Discharge: HOME | End: 2022-09-23
Payer: MEDICARE

## 2022-09-23 LAB
ALBUMIN SERPL-MCNC: 4.3 G/DL (ref 3.5–5.2)
ALBUMIN/GLOBULIN RATIO: 1.6 (ref 1–2.5)
ALP BLD-CCNC: 90 U/L (ref 40–129)
ALT SERPL-CCNC: 19 U/L (ref 5–41)
ANION GAP SERPL CALCULATED.3IONS-SCNC: 11 MMOL/L (ref 9–17)
AST SERPL-CCNC: 18 U/L
BILIRUB SERPL-MCNC: 0.6 MG/DL (ref 0.3–1.2)
BILIRUBIN URINE: NEGATIVE
BUN BLDV-MCNC: 22 MG/DL (ref 8–23)
BUN/CREAT BLD: 22 (ref 9–20)
CALCIUM SERPL-MCNC: 9.3 MG/DL (ref 8.6–10.4)
CHLORIDE BLD-SCNC: 102 MMOL/L (ref 98–107)
CHOLESTEROL/HDL RATIO: 3.2
CHOLESTEROL: 175 MG/DL
CO2: 26 MMOL/L (ref 20–31)
COLOR: YELLOW
CREAT SERPL-MCNC: 1.01 MG/DL (ref 0.7–1.2)
GFR AFRICAN AMERICAN: >60 ML/MIN
GFR NON-AFRICAN AMERICAN: >60 ML/MIN
GFR SERPL CREATININE-BSD FRML MDRD: ABNORMAL ML/MIN/{1.73_M2}
GFR SERPL CREATININE-BSD FRML MDRD: ABNORMAL ML/MIN/{1.73_M2}
GLUCOSE BLD-MCNC: 98 MG/DL (ref 70–99)
GLUCOSE URINE: NEGATIVE
HDLC SERPL-MCNC: 54 MG/DL
KETONES, URINE: NEGATIVE
LDL CHOLESTEROL: 93 MG/DL (ref 0–130)
LEUKOCYTE ESTERASE, URINE: NEGATIVE
NITRITE, URINE: NEGATIVE
PH UA: 6 (ref 5–9)
POTASSIUM SERPL-SCNC: 4.5 MMOL/L (ref 3.7–5.3)
PROTEIN UA: NEGATIVE
SODIUM BLD-SCNC: 139 MMOL/L (ref 135–144)
SPECIFIC GRAVITY UA: 1.02 (ref 1.01–1.02)
TOTAL PROTEIN: 7 G/DL (ref 6.4–8.3)
TRIGL SERPL-MCNC: 138 MG/DL
TURBIDITY: CLEAR
URINE HGB: NEGATIVE
UROBILINOGEN, URINE: NORMAL

## 2022-09-23 PROCEDURE — 36415 COLL VENOUS BLD VENIPUNCTURE: CPT

## 2022-09-23 PROCEDURE — 81003 URINALYSIS AUTO W/O SCOPE: CPT

## 2022-09-23 PROCEDURE — 80061 LIPID PANEL: CPT

## 2022-09-23 PROCEDURE — 80053 COMPREHEN METABOLIC PANEL: CPT

## 2024-12-12 ENCOUNTER — HOSPITAL ENCOUNTER (OUTPATIENT)
Dept: PHYSICAL THERAPY | Age: 69
Setting detail: THERAPIES SERIES
Discharge: HOME | End: 2024-12-12
Payer: MEDICARE

## 2024-12-12 PROCEDURE — 97110 THERAPEUTIC EXERCISES: CPT

## 2024-12-12 PROCEDURE — 97161 PT EVAL LOW COMPLEX 20 MIN: CPT

## 2024-12-12 NOTE — PROGRESS NOTES
Phone: [phone]                       [facility]          Fax: [phone]                      Outpatient Physical Therapy                                                                      Evaluation  Date: 2024  Patient: Bart Carrillo  : 1955  [facility] #: [id number]    Referring Physician: Linwood Christianson MD    Medical Diagnosis: R shoulder pain, M25.511; R RTC strain, S46.011A    Treatment Diagnosis: R RTC tendinitis  PT Insurance Information: Medicare  Total # of Visits Approved: 12   Total # of Visits to Date: 1  No Show: 0  Canceled Appointment: 0    [x] This writer acknowledges review of patient history form     Subjective  Subjective: Patient reports R shoulder pain 7/10 on average that is worse with reaching and lifting that has happened over time d/t his job. Xray showed no fracture but no MRI has been done yet. Pt does not use heat, ice or pain med's. He goes to the gym. Pt reports he believes the RTC is \"tore\" and that therapy is probably just a band-aid and that he will need surgery.  Additional Pertinent Hx: HTN, hx of MI , hx of B TKA's  and     Observations:   General Observations  Description: L shoulder AROM flex: 135*, abd: 115*, ER: T1, IR: L1. R shoulder flex: 140*, abd: 100*, ER: T1, IR: L5. Pt sits with fwd head and shoulder posture. TTP R prox biceps tendon.    Objective    Strength       Strength LUE  L Shoulder Flexion: 3+/5  L Shoulder ABduction: 3+/5  L Shoulder Internal Rotation: 4-/5  L Shoulder External Rotation: 3+/5  L Elbow Flexion: 4-/5  L Elbow Extension: 4-/5       Exercises:  Exercise 1: HEP: scap retracs with shoulder ER, retro shoulder rolls, doorway pec stretch, orange band R shoulder ER/IR    Functional Outcome Measures  Open a tight or new jar: Mild Difficulty  Do heavy household chores (e.g., wash walls, floors): Severe Difficulty  Lety a shopping bag or briefcase: Mild Difficulty  Wash your back: Severe Difficulty  Use a

## 2024-12-12 NOTE — PLAN OF CARE
[facility]           Phone: [phone]             Outpatient Physical Therapy  Fax: [phone]                                           Date: 2024  Patient: Bart Carrillo : 1955 [facility] #: [id number]   Referring Physician: Linwood Christianson MD      [x] Plan of Care   [] Updated Plan of Care    Dates of Service to Include: 2024 to 01/10/25    Diagnosis:  R shoulder pain, M25.511; R RTC strain, S46.011A     Rehab (Treatment) Diagnosis:  R RTC tendinitis         Onset Date:       Attendance  Total # of Visits to Date: 1 No Show: 0 Canceled Appointment: 0    Assessment  Assessment: Patient is 68 year old male with dx of R shoulder pain and RTC strain who presents with increased pain 7/10 on average.L shoulder AROM flex: 135*, abd: 115*, ER: T1, IR: L1. R shoulder flex: 140*, abd: 100*, ER: T1, IR: L5. Pt sits with fwd head and shoulder posture. TTP R prox biceps tendon. Decreased R shoulder strength: 3+/5 grossly. Patient to benefit from physical therapy to improve R shoulder ROM and strength, decrease pain and return to PLOF.      Goals  Short Term Goals  Time Frame for Short Term Goals: 3 weeks  Short Term Goal 1: Patient to initiate HEP for improved scapular strength and R shoulder ROM.  Short Term Goal 2: Patient to given updated HEP at each visit to progress postural strengthening and shoulder ROM.  Short Term Goal 3: Patient to tolerate manual techniques to improve R shoulder capsular mobility and shoulder ROM.  Long Term Goals  Time Frame for Long Term Goals : 6 weeks  Long Term Goal 1: Patient to be independent and compliant with HEP.  Long Term Goal 2: Patient to have improved R shoulder AROM flex/abd: 150*, ER: T2 and IR: T10 with no increase in pain for improved mobility.  Long Term Goal 3: Patient to have improved R shoulder strength >/=4/5 all planes for improved stability.  Long Term

## 2024-12-18 ENCOUNTER — APPOINTMENT (OUTPATIENT)
Dept: PHYSICAL THERAPY | Age: 69
End: 2024-12-18
Payer: MEDICARE

## 2024-12-19 ENCOUNTER — HOSPITAL ENCOUNTER (OUTPATIENT)
Dept: PHYSICAL THERAPY | Age: 69
Setting detail: THERAPIES SERIES
Discharge: HOME | End: 2024-12-19
Payer: MEDICARE

## 2024-12-19 PROCEDURE — 97140 MANUAL THERAPY 1/> REGIONS: CPT

## 2024-12-19 PROCEDURE — G0283 ELEC STIM OTHER THAN WOUND: HCPCS

## 2024-12-19 PROCEDURE — 97110 THERAPEUTIC EXERCISES: CPT

## 2024-12-19 NOTE — PROGRESS NOTES
Phone: [phone]                 [facility]           Fax: [phone]                           Outpatient Physical Therapy                                                                            Daily Note    Patient: Bart Carrillo : 1955  [facility] #: [id number]   Referring Physician: Linwood Christianson MD  Date: 2024       Treatment Diagnosis: R RTC tendinitis    PT Insurance Information: Medicare  Total # of Visits Approved: 12 Per Physician Order  Total # of Visits to Date: 2  No Show: 0  Canceled Appointment: 0    01/10/25 Plan of Care/Recert Due    Pre-Treatment Pain:  1/10  Subjective: Patient reports needing a shoulder replacement and is not sure if he will continue with therapy. Pt states he goes to [employer] daily. Patient reports a constant ache, 1/10 pain.    Exercises:  Exercise 1: HEP: scap retracs with shoulder ER, retro shoulder rolls, doorway pec stretch, orange band R shoulder ER/IR  Exercise 2: pulleys 5 min  Exercise 3: cane flex, ER x15  Exercise 4: ball on wall 10x  Exercise 5: shrugs, scap squeeze, retro rolls x15ea  Exercise 6: retractions, LAE GTB 2 x 10    Manual:  Soft Tissue Mobilizaton: gentle STM to anterior deltoid, bicep, UT  Other: PROM in all planes    Modality: IFC/CP applied to R shoulder to reduce inflammation and discomfort.     Assessment  Assessment: Initiated gentle R shoulder strengthening and ROM ther ex with verbal cues for postural awareness. Pt fatigues quickly with cane flexion requiring rest breaks. Manual interventions provided with cues to reduce muscle guarding, poor carryover. IFC/CP applied to reduce edema and discomfort.    Activity Tolerance  Activity Tolerance: Patient tolerated treatment well, Patient limited by pain    Patient Education  Patient Education: HEP  Pt verbalized/demonstrated good understanding:     [x] Yes         [] No, pt required further clarification.       Post Treatment Pain:  1/10      Plan  Plan

## 2024-12-24 ENCOUNTER — APPOINTMENT (OUTPATIENT)
Dept: PHYSICAL THERAPY | Age: 69
End: 2024-12-24
Payer: MEDICARE

## 2024-12-24 ENCOUNTER — HOSPITAL ENCOUNTER (OUTPATIENT)
Dept: PHYSICAL THERAPY | Age: 69
Setting detail: THERAPIES SERIES
Discharge: HOME | End: 2024-12-24
Payer: MEDICARE

## 2024-12-24 PROCEDURE — 97140 MANUAL THERAPY 1/> REGIONS: CPT

## 2024-12-24 PROCEDURE — 97110 THERAPEUTIC EXERCISES: CPT

## 2024-12-24 NOTE — PROGRESS NOTES
Physical Therapy  Phone: [phone]                 [facility]           Fax: [phone]                           Outpatient Physical Therapy                                                                            Daily Note    Patient: Bart Carrillo : 1955  [facility] #: [id number]   Referring Physician: Linwood Christianson MD  Date: 2024     Treatment Diagnosis: R RTC tendinitis    PT Insurance Information: Medicare  Total # of Visits Approved: 12 Per Physician Order  Total # of Visits to Date: 3  No Show: 0  Canceled Appointment: 0    01/10/25 Plan of Care/Recert Due    Pre-Treatment Pain:  0/10  Subjective: Pt states he has more achy then painful this date. Pt notes he has been complete HEP at home along with going to [employer]. Pt reports increased pain with sleeping,    Exercises:  Exercise 2: pulleys 5 min  Exercise 3: standing cane flex, ER x15  Exercise 4: ball on wall 10x  Exercise 6: rows, LAE BTB 2 x 10  Exercise 7: supine SA [id number]  Exercise 8: ER/IR YTB x15    Manual:  Other: PROM in all planes    Modality:   Modality Flow Sheet:   Performed (X) Tx Modality               X Cold Pack: Supine R shld CP x10 min                      Assessment  Assessment: Progressed treatment with additon of scapular strengthening with rows/ext, cues given for maintaining proper technique throughout. Pt required cues with ER to limit trunk rotation and shld abd compensations. Pt having mild increased pain with SA punch supine. Cues given throughout manual therapy to avoid muscle gaurding to reduce discomfort with shld flexion.    Activity Tolerance  Activity Tolerance: Patient tolerated treatment well    Patient Education  Patient Education: Educated pt to use pillow to prop R UE at night when sleeping on back to help reduce pain.  Pt verbalized/demonstrated good understanding:     [x] Yes         [] No, pt required further clarification.    Post Treatment Pain:  0/10    Plan  Plan

## 2025-01-03 ENCOUNTER — HOSPITAL ENCOUNTER (OUTPATIENT)
Dept: PHYSICAL THERAPY | Age: 70
Setting detail: THERAPIES SERIES
Discharge: HOME | End: 2025-01-03
Payer: MEDICARE

## 2025-01-03 PROCEDURE — 97110 THERAPEUTIC EXERCISES: CPT

## 2025-01-03 NOTE — PROGRESS NOTES
Phone: [phone]                 [facility]           Fax: [phone]                           Outpatient Physical Therapy                                                                            Daily Note    Patient: Bart Carrillo : 1955  CSN #: [id number]   Referring Physician: Linwood Christianson MD  Date: 1/3/2025    Diagnosis: R shoulder pain, M25.511; R RTC strain, S46.011A  Treatment Diagnosis: R RTC tendinitis    PT Insurance Information: Medicare  Total # of Visits Approved: 12 Per Physician Order  Total # of Visits to Date: 4  No Show: 0  Canceled Appointment: 0    01/10/25 Plan of Care/Recert Due    Pre-Treatment Pain:  1/10  Subjective: Patient reports achiness but no real pain today. Reports not much change since beginning PT.    Exercises:  Exercise 1: HEP: scap retracs with shoulder ER, retro shoulder rolls, doorway pec stretch, orange band R shoulder ER/IR  Exercise 2: pulleys 5 min  Exercise 3: standing cane flex, ER x15  Exercise 4: ball on wall 10x  Exercise 5: shrugs, scap squeeze, retro rolls x15ea, 3#  Exercise 6: rows, LAE BTB 2 x 10  Exercise 7: supine SA [id number]  Exercise 8: ER/IR YTB x15  Exercise 9: UBE fwd/retro 3/3min    Manual:  Other: PROM in all planes    Modality: CP to R shoulder x10 min to decrease inflammation    Assessment  Assessment: Patient continues to require vc's for proper form and technique throughout ther ex with fair (-) follow through. Will place patient on hold per patient request at this time.    Activity Tolerance  Activity Tolerance: Patient tolerated treatment well    Patient Education  Exercise technique   Pt verbalized/demonstrated good understanding:     [x] Yes         [] No, pt required further clarification.       Post Treatment Pain:  1/10      Plan  Plan Frequency: 2  Plan weeks: 4       Goals  (Total # of Visits to Date: 4)      Short Term Goals  Time Frame for Short Term Goals: 3 weeks  Short Term Goal 1: Patient to

## 2025-01-08 ENCOUNTER — APPOINTMENT (OUTPATIENT)
Dept: PHYSICAL THERAPY | Age: 70
End: 2025-01-08
Payer: MEDICARE

## 2025-01-10 ENCOUNTER — APPOINTMENT (OUTPATIENT)
Dept: PHYSICAL THERAPY | Age: 70
End: 2025-01-10
Payer: MEDICARE

## 2025-02-20 ENCOUNTER — TRANSCRIBE ORDERS (OUTPATIENT)
Dept: ADMINISTRATIVE | Age: 70
End: 2025-02-20

## 2025-02-20 DIAGNOSIS — S80.11XA CONTUSION OF RIGHT LOWER LEG, INITIAL ENCOUNTER: Primary | ICD-10-CM

## 2025-02-21 ENCOUNTER — HOSPITAL ENCOUNTER (OUTPATIENT)
Dept: VASCULAR LAB | Age: 70
Discharge: HOME | End: 2025-02-23
Attending: FAMILY MEDICINE
Payer: COMMERCIAL

## 2025-02-21 DIAGNOSIS — S80.11XA CONTUSION OF RIGHT LOWER LEG, INITIAL ENCOUNTER: ICD-10-CM

## 2025-02-21 PROCEDURE — 93971 EXTREMITY STUDY: CPT

## 2025-02-22 PROCEDURE — 93971 EXTREMITY STUDY: CPT | Performed by: STUDENT IN AN ORGANIZED HEALTH CARE EDUCATION/TRAINING PROGRAM

## 2025-03-06 ENCOUNTER — HOSPITAL ENCOUNTER (OUTPATIENT)
Age: 70
Discharge: HOME | End: 2025-03-06
Payer: COMMERCIAL

## 2025-03-06 LAB
ERYTHROCYTE [DISTWIDTH] IN BLOOD BY AUTOMATED COUNT: 15.9 % (ref 11.8–14.4)
HCT VFR BLD AUTO: 39.1 % (ref 40.7–50.3)
HGB BLD-MCNC: 11.9 G/DL (ref 13–17)
MCH RBC QN AUTO: 25.3 PG (ref 25.2–33.5)
MCHC RBC AUTO-ENTMCNC: 30.4 G/DL (ref 28.4–34.8)
MCV RBC AUTO: 83 FL (ref 82.6–102.9)
NRBC BLD-RTO: 0 PER 100 WBC
PLATELET # BLD AUTO: 239 K/UL (ref 138–453)
PMV BLD AUTO: 10.6 FL (ref 8.1–13.5)
RBC # BLD AUTO: 4.71 M/UL (ref 4.21–5.77)
WBC OTHER # BLD: 6.5 K/UL (ref 3.5–11.3)

## 2025-03-06 PROCEDURE — 85027 COMPLETE CBC AUTOMATED: CPT

## 2025-03-06 PROCEDURE — 36415 COLL VENOUS BLD VENIPUNCTURE: CPT

## (undated) DEVICE — SOLUTION IV 50ML 0.9% SOD CHL PLAS CONT USP VIAFLX

## (undated) DEVICE — STRIP,CLOSURE,WOUND,MEDI-STRIP,1/2X4: Brand: MEDLINE

## (undated) DEVICE — PEN: MARKING STD 100/CS: Brand: MEDICAL ACTION INDUSTRIES

## (undated) DEVICE — SCREW BNE L48MM CONSTRN CNDYL KNEE HEX HD STEM FOR LEG NXGN
Type: IMPLANTABLE DEVICE | Site: KNEE | Status: NON-FUNCTIONAL
Removed: 2020-10-29

## (undated) DEVICE — NEEDLE SPNL L3.5IN PNK HUB S STL REG WALL FIT STYL W/ QNCKE

## (undated) DEVICE — FAN SPRAY KIT: Brand: PULSAVAC®

## (undated) DEVICE — SPONGE LAP W18XL18IN WHT COT 4 PLY FLD STRUNG RADPQ DISP ST

## (undated) DEVICE — 3 BONE CEMENT MIXER WITH SMALL SPATULA: Brand: MIXEVAC

## (undated) DEVICE — SUTURE VCRL SZ 1 L36IN ABSRB UD L36MM CT-1 1/2 CIR J947H

## (undated) DEVICE — SUTURE VCRL SZ 2-0 L36IN ABSRB UD L40MM CT 1/2 CIR J957H

## (undated) DEVICE — DECANTER FLD 9IN ST BG FOR ASEP TRNSF OF FLD

## (undated) DEVICE — GLOVE ORANGE PI 7 1/2   MSG9075

## (undated) DEVICE — CUFF TOURNIQUET 34 SNG BLADDER DUAL PORT

## (undated) DEVICE — NEEDLE SPNL 20GA L3.5IN YEL HUB S STL REG WALL FIT STYL W/

## (undated) DEVICE — E-Z CLEAN, NON-STICK, PTFE COATED, ELECTROSURGICAL BLADE ELECTRODE, 6.5 INCH (16.5 CM): Brand: MEGADYNE

## (undated) DEVICE — Device

## (undated) DEVICE — BANDAGE COMPR M W6INXL10YD WHT BGE VELC E MTRX HK AND LOOP

## (undated) DEVICE — DUAL CUT SAGITTAL BLADE

## (undated) DEVICE — SUTURE MCRYL SZ 3-0 L27IN ABSRB UD L24MM PS-1 3/8 CIR PRIM Y936H

## (undated) DEVICE — SUTURE MCRYL + SZ 4-0 L27IN ABSRB UD L19MM PS-2 3/8 CIR MCP426H

## (undated) DEVICE — ULTRACLEAN ACCESSORY ELECTRODE 4" (10.16 CM) COATED BLADE: Brand: ULTRACLEAN

## (undated) DEVICE — SYRINGE MED 10ML TRNSLUC BRL PLUNG BLK MRK POLYPR CTRL

## (undated) DEVICE — GLOVE SURG SZ 75 L12IN FNGR THK87MIL DK GRN LTX FREE ISOLEX

## (undated) DEVICE — Z DISCONTINUED BY MEDLINE USE 2711682 TRAY SKIN PREP DRY W/ PREM GLV

## (undated) DEVICE — DRESSING SURESITE-123PLUSPAD 2.4 IN X2.8 IN

## (undated) DEVICE — COVER,TABLE,HEAVY DUTY,77"X90",STRL: Brand: MEDLINE

## (undated) DEVICE — SOLUTION SCRB 4OZ 4% CHG CLN BASE FOR PT SKIN ANTISEPSIS

## (undated) DEVICE — STERILE PATIENT PROTECTIVE PAD FOR IMP® KNEE POSITIONERS & COHESIVE WRAP (10 / CASE): Brand: DE MAYO KNEE POSITIONER®

## (undated) DEVICE — KIT EVAC 0.13IN RECT TB DIA10FR 400CC PVC 3 SPR Y CONN DRN

## (undated) DEVICE — SOLUTION IV IRRIG 0.9% NACL 3000ML BAG 2B7477

## (undated) DEVICE — SOLUTION IV IRRIG POUR BRL 0.9% SODIUM CHL 2F7124

## (undated) DEVICE — 3M™ STERI-DRAPE™ U-DRAPE 1015: Brand: STERI-DRAPE™

## (undated) DEVICE — ZIPPERED TOGA, X-LARGE: Brand: FLYTE, SURGICOOL

## (undated) DEVICE — BLADE SAW W12.5XL70MM THK0.8MM CUT THK1.12MM S STL RECIP

## (undated) DEVICE — 3000CC GUARDIAN II: Brand: GUARDIAN

## (undated) DEVICE — SUTURE VCRL SZ 2-0 L27IN ABSRB UD L26MM SH 1/2 CIR J417H

## (undated) DEVICE — SHEET,DRAPE,53X77,STERILE: Brand: MEDLINE

## (undated) DEVICE — DRESSING TRNSPAR W4XL4.8IN W/ N ADH PD SURESITE-123 PLUSPD